# Patient Record
Sex: FEMALE | Race: WHITE | NOT HISPANIC OR LATINO | Employment: PART TIME
[De-identification: names, ages, dates, MRNs, and addresses within clinical notes are randomized per-mention and may not be internally consistent; named-entity substitution may affect disease eponyms.]

---

## 2016-09-16 LAB
PAP SMEAR - HIM PATIENT REPORTED: NEGATIVE
PAP: NORMAL

## 2017-06-24 ENCOUNTER — HEALTH MAINTENANCE LETTER (OUTPATIENT)
Age: 42
End: 2017-06-24

## 2017-08-17 ENCOUNTER — COMMUNICATION - HEALTHEAST (OUTPATIENT)
Dept: SURGERY | Facility: CLINIC | Age: 42
End: 2017-08-17

## 2017-08-17 DIAGNOSIS — K91.2 POSTSURGICAL MALABSORPTION: ICD-10-CM

## 2017-08-17 DIAGNOSIS — K90.9 INTESTINAL MALABSORPTION, UNSPECIFIED: ICD-10-CM

## 2017-08-17 DIAGNOSIS — Z98.84 HISTORY OF ROUX-EN-Y GASTRIC BYPASS: ICD-10-CM

## 2017-09-05 ENCOUNTER — OFFICE VISIT (OUTPATIENT)
Dept: FAMILY MEDICINE | Facility: CLINIC | Age: 42
End: 2017-09-05
Payer: COMMERCIAL

## 2017-09-05 VITALS
WEIGHT: 143.2 LBS | SYSTOLIC BLOOD PRESSURE: 108 MMHG | BODY MASS INDEX: 25.37 KG/M2 | DIASTOLIC BLOOD PRESSURE: 70 MMHG | HEIGHT: 63 IN | HEART RATE: 76 BPM

## 2017-09-05 DIAGNOSIS — F39 MOOD DISORDER (H): ICD-10-CM

## 2017-09-05 DIAGNOSIS — G47.00 PERSISTENT INSOMNIA: Primary | ICD-10-CM

## 2017-09-05 DIAGNOSIS — Z23 NEED FOR DIPHTHERIA-TETANUS-PERTUSSIS (TDAP) VACCINE: ICD-10-CM

## 2017-09-05 PROCEDURE — 99203 OFFICE O/P NEW LOW 30 MIN: CPT | Mod: 25 | Performed by: FAMILY MEDICINE

## 2017-09-05 PROCEDURE — 90715 TDAP VACCINE 7 YRS/> IM: CPT | Performed by: FAMILY MEDICINE

## 2017-09-05 PROCEDURE — 90471 IMMUNIZATION ADMIN: CPT | Performed by: FAMILY MEDICINE

## 2017-09-05 RX ORDER — QUETIAPINE FUMARATE 50 MG/1
TABLET, FILM COATED ORAL
Qty: 60 TABLET | Refills: 1 | Status: SHIPPED | OUTPATIENT
Start: 2017-09-05 | End: 2017-11-07

## 2017-09-05 RX ORDER — CYANOCOBALAMIN 1000 UG/ML
1 INJECTION, SOLUTION INTRAMUSCULAR; SUBCUTANEOUS
COMMUNITY
End: 2024-09-24

## 2017-09-05 ASSESSMENT — ANXIETY QUESTIONNAIRES
GAD7 TOTAL SCORE: 19
6. BECOMING EASILY ANNOYED OR IRRITABLE: NEARLY EVERY DAY
7. FEELING AFRAID AS IF SOMETHING AWFUL MIGHT HAPPEN: SEVERAL DAYS
5. BEING SO RESTLESS THAT IT IS HARD TO SIT STILL: NEARLY EVERY DAY
1. FEELING NERVOUS, ANXIOUS, OR ON EDGE: NEARLY EVERY DAY
3. WORRYING TOO MUCH ABOUT DIFFERENT THINGS: NEARLY EVERY DAY
2. NOT BEING ABLE TO STOP OR CONTROL WORRYING: NEARLY EVERY DAY

## 2017-09-05 ASSESSMENT — PATIENT HEALTH QUESTIONNAIRE - PHQ9
SUM OF ALL RESPONSES TO PHQ QUESTIONS 1-9: 16
5. POOR APPETITE OR OVEREATING: NEARLY EVERY DAY

## 2017-09-05 NOTE — NURSING NOTE
"Chief Complaint   Patient presents with     [facility] Care     Memory Loss     Memory issues.        Initial /70  Pulse 76  Ht 5' 3.25\" (1.607 m)  Wt 143 lb 3.2 oz (65 kg)  BMI 25.17 kg/m2 Estimated body mass index is 25.17 kg/(m^2) as calculated from the following:    Height as of this encounter: 5' 3.25\" (1.607 m).    Weight as of this encounter: 143 lb 3.2 oz (65 kg).  Medication Reconciliation: complete   Donna Johnston CMA    "

## 2017-09-05 NOTE — MR AVS SNAPSHOT
After Visit Summary   9/5/2017    Kenya Zurita    MRN: [id number]           Patient Information     Date Of Birth          1975        Visit Information        Provider Department      9/5/2017 1:00 PM Yen Donaldson MD [facility]'s Diagnoses     Persistent insomnia    -  1    Mood disorder (H)           Follow-ups after your visit        Additional Services     MENTAL HEALTH REFERRAL       Your provider has referred you to: [facility]: [facility]  [phone]   [email or web address]   *Referral from [facility] Primary Care Provider required - Consultation Model - medication management & future refills will be returned to [facility] PCP upon completion of evaluation  *Please call to schedule an appointment.    All scheduling is subject to the client's specific insurance plan & benefits, provider/location availability, and provider clinical specialities.  Please arrive 15 minutes early for your first appointment and bring your completed paperwork.    Please be aware that coverage of these services is subject to the terms and limitations of your health insurance plan.  Call member services at your health plan with any benefit or coverage questions.                  Who to contact     Normal or non-critical lab and imaging results will be communicated to you by MyChart, letter or phone within 4 business days after the clinic has received the results. If you do not hear from us within 7 days, please contact the clinic through MyChart or phone. If you have a critical or abnormal lab result, we will notify you by phone as soon as possible.  Submit refill requests through [employer] or call your pharmacy and they will forward the refill request to us. Please allow 3 business days for your refill to be completed.          If you need to speak with a  for additional  "information , please call: [phone]             Additional Information About Your Visit        MyChart Information     [facility] gives you secure access to your electronic health record. If you see a primary care provider, you can also send messages to your care team and make appointments. If you have questions, please call your primary care clinic.  If you do not have a primary care provider, please call [phone] and they will assist you.        Care EveryWhere ID     This is your Care EveryWhere ID. This could be used by other organizations to access your [place] medical records  [id number]        Your Vitals Were     Pulse Height BMI (Body Mass Index)             76 5' 3.25\" (1.607 m) 25.17 kg/m2          Blood Pressure from Last 3 Encounters:   09/05/17 108/70   07/09/12 125/70    Weight from Last 3 Encounters:   09/05/17 143 lb 3.2 oz (65 kg)              We Performed the Following     MENTAL HEALTH REFERRAL          Today's Medication Changes          These changes are accurate as of: 9/5/17  1:59 PM.  If you have any questions, ask your nurse or doctor.               Start taking these medicines.        Dose/Directions    QUEtiapine 50 MG tablet   Commonly known as:  SEROQUEL   Used for:  Persistent insomnia   Started by:  Yen Donaldson MD        Start with 1 tablet at bedtime may increase to 2 if tolerated   Quantity:  60 tablet   Refills:  1            Where to get your medicines      These medications were sent to [place] [address] [place] [address] [address]     Phone:  [phone]     QUEtiapine 50 MG tablet                Primary Care Provider Office Phone # Fax #    Briseyda Michael [phone]       [facility] [address] [facility] [address]        Equal Access to Services     SRI LANGFORD AH: Yojana Morris, robel clemente, karan mo " ah. So [facility] [phone].    ATENCIÓN: Si neil leone, tiene a mercado disposición servicios gratuitos de asistencia lingüística. Tara al [phone].    We comply with applicable federal civil rights laws and [place] laws. We do not discriminate on the basis of race, color, national origin, age, disability sex, sexual orientation or gender identity.            Thank you!     Thank you for choosing [facility]  for your care. Our goal is always to provide you with excellent care. Hearing back from our patients is one way we can continue to improve our services. Please take a few minutes to complete the written survey that you may receive in the mail after your visit with us. Thank you!             Your Updated Medication List - Protect others around you: Learn how to safely use, store and throw away your medicines at www.disposemymeds.org.          This list is accurate as of: 9/5/17  1:59 PM.  Always use your most recent med list.                   Brand Name Dispense Instructions for use Diagnosis    CALCIUM PO           cyanocobalamin 1000 MCG/ML injection    VITAMIN B12     Inject 1 mL into the muscle every 7 days        MULTI-VITAMIN DAILY PO           QUEtiapine 50 MG tablet    SEROQUEL    60 tablet    Start with 1 tablet at bedtime may increase to 2 if tolerated    Persistent insomnia       VITAMIN D PO

## 2017-09-05 NOTE — PROGRESS NOTES
SUBJECTIVE:                                                    Kenya Zurita is a 42 year old female who presents to clinic today for the following health issues:    Abnormal Mood Symptoms  Onset: earlier this year    Description:   Depression: YES  Anxiety: YES    Accompanying Signs & Symptoms:         Still participating in activities that you used to enjoy: YES  Fatigue: YES  Irritability: YES  Difficulty concentrating: YES  Changes in appetite: no  Problems with sleep: YES  Heart racing/beating fast : YES- sometimes  Thoughts of hurting yourself or others: none    History:   Recent stress: YES- recent move  Prior depression hospitalization: 2001   Family history of depression: YES  Family history of anxiety: YES    Precipitating factors:   Alcohol/drug use: no    Alleviating factors:  no  Therapies Tried and outcome: Ativan (Lorezepam), Buspar (Buspirone), Celexa (Citalopram), Effexor XR (Venafaxine), Lexapro (Escitalopram), Paxil (Paroxetine), Prozac (Fluoxetine), Remeron (Mirtazapine), Wellbutrin (Bupropion) and Zoloft (Sertraline) - most would help in the beginging and than wear off.     PHQ-9 ([employer]) 9/5/2017   1.  Little interest or pleasure in doing things 1   2.  Feeling down, depressed, or hopeless 1   3.  Trouble falling or staying asleep, or sleeping too much 3   4.  Feeling tired or having little energy 3   5.  Poor appetite or overeating 0   6.  Feeling bad about yourself 2   7.  Trouble concentrating 3   8.  Moving slowly or restless 3   9.  Suicidal or self-harm thoughts 0   PHQ-9 Total Score 16   MINNA-7   [employer], 2002; Used with Permission) 9/5/2017   1. Feeling nervous, anxious, or on edge 3   2. Not being able to stop or control worrying 3   3. Worrying too much about different things 3   4. Trouble relaxing 3   5. Being so restless that it is hard to sit still 3   6. Becoming easily annoyed or irritable 3   7. Feeling afraid, as if something awful might happen 1   MINNA-7 Total Score 19        Problem list and histories reviewed & adjusted, as indicated.  Additional history: she has been concerned about having more concentration difficulties. In the last 6-9 months she is not sleeping well .  She will take the medications and she will take them for a month and she will feel like they start working and then she would switch medications after a year or 2   Now she doesn't even bother trying  She has been diagnosed with bipolar and she was put on zyprexa in the past but that did not help  She feels like she is antsy and saw a therapist in [place] while she was going through a divorce she was on an SSRI during this time   Her sx right now are forgetfulness. She will walk into a room and can't remember why she came in . Can't remember conversations  She is working right now she is a    She does not sleep well at night she will fall asleep and then will get out of bed and walk around   Goes to bed at 9-10 gets up at 545   Wakes up at 12 then may wake up a few more times   Gastric bypass 2009  Has been on multiple medications in the past   Has not seen a psychiatrist before   She has been doing reasearch on Motally and she thinks she has ADD. She has not been diagnosed with this and wants to know if this could be her dx.  She is also not sleeping more than a few hours per night . She can fall asleep then wakes up . She has used ambien but it never works for more than 2 hours or so.   She does have thoughts that race through her head and she can't shut her mind off.           There is no problem list on file for this patient.    Past Surgical History:   Procedure Laterality Date     GALLBLADDER SURGERY  04/2004     GASTRIC BYPASS  2009     HERNIA REPAIR  11/2014    with Tummy tuck       Social History   Substance Use Topics     Smoking status: Never Smoker     Smokeless tobacco: Never Used     Alcohol use Yes     Family History   Problem Relation Age of Onset     Other Cancer Mother      Cem      Other Cancer Father      Skin     Colon Cancer Maternal Grandfather      Other Cancer Paternal Grandfather      Brain cancer     MENTAL ILLNESS Brother      MENTAL ILLNESS Sister      Asthma Son               ROS:  Constitutional, HEENT, cardiovascular, pulmonary, GI, , musculoskeletal, neuro, skin, endocrine and psych systems are negative, except as otherwise noted.      OBJECTIVE:                                                    MENTAL STATUS EXAM:    1. Clinical observations: Kenya was clean and was adequately groomed. Kenya's emotional presentation was cooperative and isauro. She spoke talkative/verbose and fast. She maintained fair eye contact and she was cooperative in answering questions.   2. She appeared to be well-oriented in all spheres with coherent, logical, relevent and accelerated thinking.   3. Thought content: She denies somatic preoccupation, loosening of association, grandiose, paranoid, flight of ideas, auditory hallucinations, visual hallucinations, olfactory hallucination, tactile hallucinations, delusions, compulsions and obessions.   4. Affect and mood: Kenya's affect is described as anxious and her emotional attitude was cooperative and isauro. She reports the following sypmtoms: difficulty sleeping, difficulty concentrating, lack of interest or enjoyment, restless, racing thoughts, feeling easily distracted, too much worry, nervous or tense feeling and dwelling on problems.    5. Sensorium and cognition: She was in contact with reality and oriented to time, place and person.  She demonstrated no impairment in immediate, recent, or remote memory. Her insight was adequate and her  intelligence appeared to be average.         ASSESSMENT/PLAN:                                                      1. Persistent insomnia    - QUEtiapine (SEROQUEL) 50 MG tablet; Start with 1 tablet at bedtime may increase to 2 if tolerated  Dispense: 60 tablet; Refill: 1  - MENTAL HEALTH REFERRAL    2. Mood disorder  (H)    - MENTAL HEALTH REFERRAL    3. Need for diphtheria-tetanus-pertussis (Tdap) vaccine    - TDAP VACCINE (ADACEL)  - ADMIN 1st VACCINE     reports that she has never smoked. She has never used smokeless tobacco.          Yen Donaldson M.D.  [facility]

## 2017-09-06 ASSESSMENT — ANXIETY QUESTIONNAIRES: GAD7 TOTAL SCORE: 19

## 2017-09-11 ENCOUNTER — OFFICE VISIT (OUTPATIENT)
Dept: PSYCHIATRY | Facility: CLINIC | Age: 42
End: 2017-09-11
Attending: FAMILY MEDICINE
Payer: COMMERCIAL

## 2017-09-11 VITALS
BODY MASS INDEX: 25.45 KG/M2 | WEIGHT: 144.8 LBS | DIASTOLIC BLOOD PRESSURE: 71 MMHG | SYSTOLIC BLOOD PRESSURE: 107 MMHG | TEMPERATURE: 99.1 F | HEART RATE: 78 BPM

## 2017-09-11 DIAGNOSIS — F90.2 ATTENTION DEFICIT HYPERACTIVITY DISORDER (ADHD), COMBINED TYPE: Primary | ICD-10-CM

## 2017-09-11 PROCEDURE — 90792 PSYCH DIAG EVAL W/MED SRVCS: CPT | Performed by: CLINICAL NURSE SPECIALIST

## 2017-09-11 ASSESSMENT — ANXIETY QUESTIONNAIRES
2. NOT BEING ABLE TO STOP OR CONTROL WORRYING: NEARLY EVERY DAY
6. BECOMING EASILY ANNOYED OR IRRITABLE: MORE THAN HALF THE DAYS
5. BEING SO RESTLESS THAT IT IS HARD TO SIT STILL: NEARLY EVERY DAY
IF YOU CHECKED OFF ANY PROBLEMS ON THIS QUESTIONNAIRE, HOW DIFFICULT HAVE THESE PROBLEMS MADE IT FOR YOU TO DO YOUR WORK, TAKE CARE OF THINGS AT HOME, OR GET ALONG WITH OTHER PEOPLE: VERY DIFFICULT
7. FEELING AFRAID AS IF SOMETHING AWFUL MIGHT HAPPEN: SEVERAL DAYS
3. WORRYING TOO MUCH ABOUT DIFFERENT THINGS: NEARLY EVERY DAY
1. FEELING NERVOUS, ANXIOUS, OR ON EDGE: NEARLY EVERY DAY
4. TROUBLE RELAXING: NEARLY EVERY DAY
GAD7 TOTAL SCORE: 18

## 2017-09-11 ASSESSMENT — PATIENT HEALTH QUESTIONNAIRE - PHQ9: SUM OF ALL RESPONSES TO PHQ QUESTIONS 1-9: 14

## 2017-09-11 NOTE — PATIENT INSTRUCTIONS
Treatment Plan:  Continue quetiapine (Seroquel)  mg at bedtime.     Complete ADHD testing.     Follow up after testing.     - Recommend patient discuss medications with their pharmacist. Risks and benefits of medications discussed, including side effect profile.   - Safety plan was reviewed; to the ER as needed or call after hours crisis line; [phone]  - Education and counseling was done regarding use of medications, psychotherapy options  - Call [phone] for appointment or to speak to a nurse.   -Office hours: Monday through Thursday 8:00 am to 4:30 pm; Friday 8:00 am to Noon.   - Patient was given a copy of this Treatment Plan today.

## 2017-09-11 NOTE — NURSING NOTE
"No chief complaint on file.      Initial /71 (BP Location: Right arm, Patient Position: Sitting, Cuff Size: Adult Regular)  Pulse 78  Temp 99.1  F (37.3  C) (Tympanic)  Wt 144 lb 12.8 oz (65.7 kg)  BMI 25.45 kg/m2 Estimated body mass index is 25.45 kg/(m^2) as calculated from the following:    Height as of 9/5/17: 5' 3.25\" (1.607 m).    Weight as of this encounter: 144 lb 12.8 oz (65.7 kg).  Medication Reconciliation: complete    "

## 2017-09-11 NOTE — PROGRESS NOTES
"                                                         Outpatient Psychiatric Evaluation-Standard    Name: Kenya Zurita  : 1975  Date: 2017    Source of Referral:  Primary Care Physician: Briseyda Michael  Current Psychotherapist: None currently - saw 3-4 years ago.    Identifying Data:  Patient is a 42 year old, partnered / significant other female who presents for initial visit with me.  Patient is currently employed full time, . Patient attended the session alone.   60 minutes were spent on evaluation with 40 minutes CC time.    HPI:  Patient reports depression and anxiety since grade school. Patient started antidepressants at age 19. Patient has had multiple trials, with sertraline (Zoloft) working for 1-2 years, but nothing has really worked since.     Patient is currently talking quetiapine (Seroquel)  mg at bedtime, primarily due to sleep issues. Patient reports feeling overwhelmed with too much to do and is struggling to organize her thoughts. Patient reports forgetfulness, losing track of conversations, with scattered thoughts. Patient reports fiance becomes frustrated as patient cannot sit still as she becomes bored easily.  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 Patient reports in second grade, teachers said \"I would be great if I could pay attention or sit still\".     Patient had gastric bypass in .     Psychiatric Review of Symptoms:  Depression: Sleep: No change and 2-3 hours unless taking medications  Depressed Mood Interest: Decrease Energy: Decrease Concentration: Decrease Psychomotor slowing  Worthless: No change and for many years     Last PHQ-9 score = No Value exists for the : HP#PHQ9; 14  Katherine:  No symptoms  Mood Disorder Questionnaire: Negative    Anxiety: Feeling nervous or on edge  Uncontrolled worrying  Trouble relaxing  Restlessness  Easily annoyed or irritable  Thoughts of " "impending doom    GAD7 score: 18  Panic:  Palpitations  Shortness of Breath  Agoraphobia:  No  OCD:  No symptoms  Psychosis: No symptoms  ADD / ADHD: No symptoms  Gambling or shoplifting: No  Eating Disorder:  No symptoms  Suicide attempts:  Yes In 1999, attempted overdose by Prozac  Current SI risk:  No              Patient reports no suicidal feelings today. Risk is mitigated by commitment to family, \"My two kids\" Therefore, based on all available evidence including the factors cited above, he does not appear to be at imminent risk for self-harm, does not meet criteria for a 72-hr hold, and therefore remains appropriate for ongoing outpatient level of care. Currently does not have therapist.     Significant Losses / Trauma / Abuse / Neglect Issues:  There are no indications or report of: significant losses, trauma, abuse or neglect.    PTSD:  No symptoms    Issues of possible neglect are not present.    A safety and risk management plan has not been developed at this time, however client was given the after-hours number / 911 should there be a change in any of these risk factors.      Psychiatric History:   Hospitalizations: None  Past psychotherapy: medication(s) from physician / PCP    Past medication trials: (patient was presented with a list to review all currently available antidepressants, mood stabilizers, tranquilizers, hypnotics and antipsychotics)  New Antidepressants:  Celexa (citalopram), Cymbalta (duloxetine), Effexor (venlafaxine), Lexapro (escitalopram), Paxil (paroxetine), Prozac (fluoxetine), Remeron (mirtazepine) and Zoloft (sertraline)  Mood Stabilizers:  Depakote and Depakot ER (valproate/valproic acid)  Newer Antipsychotics: Risperdal (risperidone) and Zyprexa (olanzapine)  Sedatives/Hypnotics:  Ambien (zolpidem), Benadryl (diphenhydramine), Lunesta (eszopiclone), Melatonin, Restoril (temazepam) and Sonata (zaleplon)  Tranquilizers:  Atarax/Vistaril (hydroxyzine), Ativan (lorazepam), Buspar " "(buspirone) and Xanax (alprazolam)      Chemical Use History:  Patient has not received chemical dependency treatment in the past.  Patient reports no problems as a result of their drinking / drug use.  Current use of drugs or alcohol: N/A  CAGE: None of the patient's responses to the CAGE screening were positive / Negative CAGE score   Based on the negative Cage-Aid score and clinical interview there  are not indications of drug or alcohol abuse.  Tobacco use: No  Ready to quit?  No  NRT tried: NA    Past Medical History:  Surgery:   Past Surgical History:   Procedure Laterality Date     GALLBLADDER SURGERY  04/2004     GASTRIC BYPASS  2009     HERNIA REPAIR  11/2014    with Tummy tuck     Allergies:  No Known Allergies  Primary MD: Briseyda Michael  Seizures or head injury: Yes, 2 concussions  Diet: \"Normal\"  Exercise: no regular exercise program  Supplements: none    Current Medications:  Current Outpatient Prescriptions   Medication Sig     levonorgestrel (MIRENA) 20 MCG/24HR IUD 1 Device by Intrauterine route     Cholecalciferol (VITAMIN D PO)      cyanocobalamin (VITAMIN B12) 1000 MCG/ML injection Inject 1 mL into the muscle every 7 days     Multiple Vitamin (MULTI-VITAMIN DAILY PO)      CALCIUM PO      QUEtiapine (SEROQUEL) 50 MG tablet Start with 1 tablet at bedtime may increase to 2 if tolerated     No current facility-administered medications for this visit.        Vital Signs:  /71 (BP Location: Right arm, Patient Position: Sitting, Cuff Size: Adult Regular)  Pulse 78  Temp 99.1  F (37.3  C) (Tympanic)  Wt 144 lb 12.8 oz (65.7 kg)  BMI 25.45 kg/m2      Review of Systems:  (constitutional, HEENT, Neuro, Cardiac, Pulmonary, GI, , Heme / Lymph, Endocrine, Skin / Breast, MSK reviewed)  10 point ROS was negative except for the following: those listed above    Family History:   (with focus on psychiatric and substance abuse)  Chemical use problems Brother - alcohol  Mental health history: Brother - " "Bipolar  Patient reports family history includes Asthma in her son; Colon Cancer in her maternal grandfather; MENTAL ILLNESS in her brother and sister; Other Cancer in her father, mother, and paternal grandfather.    Social History:   Patient grew up in [place], [place]   Siblings: 2 half brothers, 1 half sister  Intact family growing up?; Parents  when patient age 14.   Highest education level was associate degree / vocational certificate.   Marital status and living situation: Lives with lisha and her two children  two children. Ages 13 and 15  she has not been involved with the legal system.      Mental Status Assessment:     Appearance:  Well groomed      Behavior/relationship to examiner/demeanor:  Cooperative, engaged and pleasant  Motor activity:  Normal  Gait:  Normal   Speech:  Normal in volume, articulation, coherence   Mood (subjective report):  \"Distracted\"  Affect (objective appearance):  Mood congruent  Thought Process (Associations):  Logical, linear and goal directed  Thought content:  No evidence of suicidal or homicidal ideation,          no overt psychosis and                    patient does not appear to be responding to internal stimuli  Oriented to person, place, date/time   Attention Span and concentration: Intact   Memory:  Short-term memory intact and Long-term memory; Intact  Language:  Fluent   Fund of Knowledge/Intelligence:  Average  Use of language: Intact   Abstraction:  Normal  Insight:  Adequate  Judgment:  Adequate for safety    DSM5  Diagnosis:    Rule Out Attention-Deficit/Hyperactivity Disorder  314.01 (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  Things that may interfere with the patient's success include:denies.    WHODAS 2.0 TOTAL " SCORES 9/11/2017   Total Score 35         Impression:  It appears patient may have ADHD, combined type which would not respond well to most antidepressants. After discussing treatment options, such as a trial of bupropion (Wellbutrin) SR formula due to gastric bypass or testing, patient elected to complete testing for ADHD. If testing does not support ADHD, will consider lamotrigine (Lamictal) for mood stabilization.     Medication side effects and alternatives reviewed.     Treatment Plan:  Continue quetiapine (Seroquel)  mg at bedtime.     Complete ADHD testing.     Follow up after testing.     - Recommend patient discuss medications with their pharmacist. Risks and benefits of medications discussed, including side effect profile.   - Safety plan was reviewed; to the ER as needed or call after hours crisis line; [phone]  - Education and counseling was done regarding use of medications, psychotherapy options  - Call [phone] for appointment or to speak to a nurse.   -Office hours: Monday through Thursday 8:00 am to 4:30 pm; Friday 8:00 am to Noon.   - Patient was given a copy of this Treatment Plan today.     My Practice Policy was reviewed and signed: YES       Patient will continue to be seen for ongoing consultation and stabilization.      Signed: Geraldine Brand, RN, MS, APRN                 Psychiatry

## 2017-09-12 ASSESSMENT — ANXIETY QUESTIONNAIRES: GAD7 TOTAL SCORE: 18

## 2017-10-13 ENCOUNTER — FCC EXTENDED DOCUMENTATION (OUTPATIENT)
Dept: PSYCHOLOGY | Facility: CLINIC | Age: 42
End: 2017-10-13

## 2017-10-13 ENCOUNTER — OFFICE VISIT (OUTPATIENT)
Dept: PSYCHOLOGY | Facility: CLINIC | Age: 42
End: 2017-10-13
Payer: COMMERCIAL

## 2017-10-13 DIAGNOSIS — F41.1 GAD (GENERALIZED ANXIETY DISORDER): Primary | ICD-10-CM

## 2017-10-13 DIAGNOSIS — F33.1 MDD (MAJOR DEPRESSIVE DISORDER), RECURRENT EPISODE, MODERATE (H): ICD-10-CM

## 2017-10-13 PROCEDURE — 90834 PSYTX W PT 45 MINUTES: CPT | Performed by: PSYCHOLOGIST

## 2017-10-13 ASSESSMENT — ANXIETY QUESTIONNAIRES
3. WORRYING TOO MUCH ABOUT DIFFERENT THINGS: NEARLY EVERY DAY
6. BECOMING EASILY ANNOYED OR IRRITABLE: NEARLY EVERY DAY
5. BEING SO RESTLESS THAT IT IS HARD TO SIT STILL: NEARLY EVERY DAY
7. FEELING AFRAID AS IF SOMETHING AWFUL MIGHT HAPPEN: NEARLY EVERY DAY
2. NOT BEING ABLE TO STOP OR CONTROL WORRYING: NEARLY EVERY DAY
IF YOU CHECKED OFF ANY PROBLEMS ON THIS QUESTIONNAIRE, HOW DIFFICULT HAVE THESE PROBLEMS MADE IT FOR YOU TO DO YOUR WORK, TAKE CARE OF THINGS AT HOME, OR GET ALONG WITH OTHER PEOPLE: EXTREMELY DIFFICULT
GAD7 TOTAL SCORE: 21
1. FEELING NERVOUS, ANXIOUS, OR ON EDGE: NEARLY EVERY DAY

## 2017-10-13 ASSESSMENT — PATIENT HEALTH QUESTIONNAIRE - PHQ9
SUM OF ALL RESPONSES TO PHQ QUESTIONS 1-9: 18
5. POOR APPETITE OR OVEREATING: NEARLY EVERY DAY

## 2017-10-13 NOTE — Clinical Note
Hello,  Here is the DA for Kenya's ADHD evaluation. We will proceed with testing and I will be sure to route the final report to you. Please let me know if you have questions or concerns.  Thank you! Anaya Pham, PhD, LP

## 2017-10-13 NOTE — PROGRESS NOTES
Progress Note - Initial Session    Client Name:  Kenya Zurita Date: 10/13/2017         Service Type: Individual/ADHD Eval Intake      Session Start Time: 12:00  Session End Time: 12:45       Session Length: 45 minutes      Session #: 1     Attendees: Client attended alone      Diagnostic Assessment in progress.  Unable to complete documentation at the conclusion of the first session due to gathering extensive information regarding symptom presentation, history, and impact on functioning. Client reported significant history of anxiety and depression. No risk issues reported.      Mental Status Assessment:  Appearance:   Disheveled   Eye Contact:   Good   Psychomotor Behavior: Hyperactive  Restless ; Client bounced and shook her leg quickly for the duration of the session  Attitude:   Cooperative   Orientation:   All  Speech   Rate / Production: Pressured    Volume:  Normal   Mood:    Anxious   Affect:    Appropriate   Thought Content:  Clear   Thought Form:  Coherent  Logical   Insight:    Good       Safety Issues and Plan for Safety and Risk Management:  Client denies current fears or concerns for personal safety.  Client denies current or recent suicidal ideation or behaviors.  Client denies current or recent homicidal ideation or behaviors.  Client denies current or recent self injurious behavior or ideation.  Client denies other safety concerns.  A safety and risk management plan has not been developed at this time, however client was given the after-hours number / 911 should there be a change in any of these risk factors.  Client reports there are no firearms in the house.      Diagnostic Criteria:  A. Excessive anxiety and worry about a number of events or activities (such as work or school performance).   B. The person finds it difficult to control the worry.  C. Select 3 or more symptoms (required for diagnosis). Only one item is required in children.   - Restlessness or feeling keyed up  or on edge.    - Being easily fatigued.    - Difficulty concentrating or mind going blank.    - Irritability.    - Muscle tension.    - Sleep disturbance (difficulty falling or staying asleep, or restless unsatisfying sleep).   D. The focus of the anxiety and worry is not confined to features of an Axis I disorder.  E. The anxiety, worry, or physical symptoms cause clinically significant distress or impairment in social, occupational, or other important areas of functioning.   F. The disturbance is not due to the direct physiological effects of a substance (e.g., a drug of abuse, a medication) or a general medical condition (e.g., hyperthyroidism) and does not occur exclusively during a Mood Disorder, a Psychotic Disorder, or a Pervasive Developmental Disorder.        DSM5 Diagnoses: (Sustained by DSM5 Criteria Listed Above)  Diagnoses: 300.02 (F41.1) Generalized Anxiety Disorder  Psychosocial & Contextual Factors: Client reported a history of anxiety dating back to childhood. She recalled experiencing severe anxiety as early as 4th grade. She has tried multiple antidepressants and is working with PCP and psychiatry. She described having difficulty completing tasks and being organized at home.   WHODAS 2.0 (12 item)            This questionnaire asks about difficulties due to health conditions. Health conditions  include  disease or illnesses, other health problems that may be short or long lasting,  injuries, mental health or emotional problems, and problems with alcohol or drugs.                     Think back over the past 30 days and answer these questions, thinking about how much  difficulty you had doing the following activities. For each question, please [place] only  one response.    S1 Standing for long periods such as 30 minutes? None =         1   S2 Taking care of household responsibilities? Severe =       4   S3 Learning a new task, for example, learning how to get to a new place? Moderate =   3   S4 How  much of a problem do you have joining community activities (for example, festivals, Orthodoxy or other activities) in the same way as anyone else can? Severe =       4   S5 How much have you been emotionally affected by your health problems? Severe =       4     In the past 30 days, how much difficulty did you have in:   S6 Concentrating on doing something for ten minutes? Extreme / or cannot do = 5   S7 Walking a long distance such as a kilometer (or equivalent)? None =         1   S8 Washing your whole body? Severe =       4   S9 Getting dressed? Moderate =   3   S10 Dealing with people you do not know? Severe =       4   S11 Maintaining a friendship? Severe =       4   S12 Your day to day work? Moderate =   3     H1 Overall, in the past 30 days, how many days were these difficulties present? Record number of days 30   H2 In the past 30 days, for how many days were you totally unable to carry out your usual activities or work because of any health condition? Record number of days  0   H3 In the past 30 days, not counting the days that you were totally unable, for how many days did you cut back or reduce your usual activities or work because of any health condition? Record number of days 15       Collateral Reports Completed:  Routed note to Care Team Member(s)      PLAN: (Homework, other):  Client RTC to complete ADHD DA. She was given self-report and collateral-report packets to complete for our next session.      Anaya Pham, PhD, LP

## 2017-10-13 NOTE — MR AVS SNAPSHOT
MRN:[id number]                      After Visit Summary   10/13/2017    Kenya Zurita    MRN: [id number]           Visit Information        Provider Department      10/13/2017 12:00 PM Anaya Pham, PhD [facility] ADHD      Your next 10 appointments already scheduled     Oct 26, 2017 10:00 AM CDT   Return Visit with Anaya Pham, PhD   [facility] ([facility])    [address] [place] [address] [id number] [phone]            Oct 26, 2017 11:00 AM CDT   Return Visit with Anaya Pham, PhD   [facility] ([facility])    [address] [place] [address] [phone]              MyChart Information     [employer] gives you secure access to your electronic health record. If you see a primary care provider, you can also send messages to your care team and make appointments. If you have questions, please call your primary care clinic.  If you do not have a primary care provider, please call [phone] and they will assist you.        Care EveryWhere ID     This is your Care EveryWhere ID. This could be used by other organizations to access your [facility] medical records  [id number]        Equal Access to Services     SRI LANGFORD : Hadii erin richardso Soglory, waaxda luqadaha, qaybta kaalmada ademeredith, karan pardo. So [facility] [phone].    ATENCIÓN: Si habla español, tiene a mercado disposición servicios gratuitos de asistencia lingüística. Llame al [phone].    We comply with applicable federal civil rights laws and [place] laws. We do not discriminate on the basis of race, color, national origin, age, disability, sex, sexual orientation, or gender identity.

## 2017-10-13 NOTE — Clinical Note
"Hello,  I met with Kenya today to begin the ADHD evaluation.  My initial impression is that the severe anxiety that she is currently experiencing will make it difficult to determine whether she also has ADHD. That is, her disorganization, inability to listen/focus, and forgetfulness are likely related to intense anxiety that she experiences on a daily basis. She reported doing well in school but described having intense anxiety during that time as well. She denied other symptoms of bipolar disorder (inflated mood, mood swings, impulsivity, risk-taking behavior, etc.) so I think we can rule out that diagnosis. The \"racing thoughts\" she experiences seem to be anxious thoughts. We will proceed with testing and I will be sure to route the DA and final report to you. Please let me know if you have questions or concerns.  Thank you! Anaya Pham, PhD, LP"

## 2017-10-14 ASSESSMENT — ANXIETY QUESTIONNAIRES: GAD7 TOTAL SCORE: 21

## 2017-10-26 ENCOUNTER — DOCUMENTATION ONLY (OUTPATIENT)
Dept: PSYCHOLOGY | Facility: CLINIC | Age: 42
End: 2017-10-26
Payer: COMMERCIAL

## 2017-10-26 ENCOUNTER — OFFICE VISIT (OUTPATIENT)
Dept: PSYCHOLOGY | Facility: CLINIC | Age: 42
End: 2017-10-26
Payer: COMMERCIAL

## 2017-10-26 DIAGNOSIS — F33.1 MDD (MAJOR DEPRESSIVE DISORDER), RECURRENT EPISODE, MODERATE (H): ICD-10-CM

## 2017-10-26 DIAGNOSIS — F41.1 GAD (GENERALIZED ANXIETY DISORDER): Primary | ICD-10-CM

## 2017-10-26 PROCEDURE — 99207 HC PSYCHOLOGICAL TEST BY PSYCHOLOGIST/MD, PER HR: CPT | Performed by: PSYCHOLOGIST

## 2017-10-26 PROCEDURE — 99207 ZZC NO CHARGE LOS: CPT | Performed by: PSYCHOLOGIST

## 2017-10-26 PROCEDURE — 96101 ZZHC PSYCHOLOGICAL TEST BY PSYCHOLOGIST/MD, PER HR: CPT | Performed by: PSYCHOLOGIST

## 2017-10-26 PROCEDURE — 90791 PSYCH DIAGNOSTIC EVALUATION: CPT | Mod: 59 | Performed by: PSYCHOLOGIST

## 2017-10-26 NOTE — PROGRESS NOTES
"Name: Kenya Zurita  MRN: [id number]  : 1975    Christian Adult ADHD Rating Scale-IV: Self and Other Reports (BAARS-IV)  The BAARS-IV assesses for symptoms of ADHD that are experienced in one's daily life. This assessment measure includes self and collateral rating scales designed to provide information regarding current and childhood symptoms of ADHD including inattention, hyperactivity, and impulsivity. Self-report scores are reported as percentiles. Scores at the 76th-83rd percentile are considered marginal, scores at the 84th-92nd percentile are considered borderline, scores at the 93rd-95th percentile are considered mild, scores at the 96th-98th percentile are considered moderate, and those at the 99th percentile are considered severe. Collateral or \"other\" rating scales are reported as number of symptoms observed in comparison to those reported by the client. Norms and percentile scores are not available for collateral reports.      Current Symptoms Scale--Self Report:   Client completed the self-report inventory of current symptoms. The results indicate that the client's Total ADHD Score was 55 which places her in the 99th percentile for overall ADHD symptoms. In addition, the client endorsed the following occur \"often\" or \"very often\": 5/9 (97th percentile) Inattention symptoms, 6/9 (99th percentile) Hyperactivity/Impulsivity symptoms, and 2/9 (86th percentile) Sluggish Cognitive Tempo symptoms. Client indicated that the reported symptoms have resulted in impaired functioning in school, home, work, and social relationships. Overall, the results suggest the client is reporting moderate symptoms of inattention and severe symptoms of hyperactivity/impulsivity at this time.      Current Symptoms Scale--Other Report:  Client's fiance completed the collateral report inventory of current symptoms. Based on the collateral contact's observation of symptoms, the client demonstrates the following \"often\" or \"very " "often\": 0/9 Inattention symptoms, 3/5 Hyperactivity symptoms, 0/4 Impulsivity symptoms, and 1/9 Sluggish Cognitive Tempo symptoms. The client's Total ADHD Score was 30. The collateral contact did not indicate whether the client demonstrates impaired functioning in any domains. The collateral- and self-report scores are discrepant. The collateral contact did not report observing significant symptoms of inattention or hyperactivity/impulsivity.      Childhood Symptoms Scale--Self-Report:  Client completed the self-report inventory of childhood symptoms. The results indicate that the client's Total ADHD Score was 41 which places her in the 93rd percentile for overall ADHD symptoms in childhood. In addition, the client endorsed having experienced the following \"often\" or \"very often\": 4/9 (93rd percentile) Inattention symptoms and 4/9 (94th percentile) Hyperactivity-Impulsivity symptoms. Client indicated that the reported symptoms resulted in impaired functioning in school and social relationships. Overall, the results suggest the client reported experiencing mild symptoms of inattention and hyperactivity/impulsivity as a child.     Childhood Symptoms Scale--Other Report:  Client's mother completed the collateral report inventory of childhood symptoms. Based on the collateral contact's recollection of client's childhood symptoms, the client demonstrated the following \"often\" or \"very often\": 3/9 Inattention symptoms and 5/9 Hyperactivity-Impulsivity symptoms. The client's Total ADHD Score was 44. The collateral contact indicated the client demonstrated impaired functioning in school, home, and social relationships. The collateral- and self-report scores are similar and suggest the client experienced some symptoms of inattention and hyperactivity/impulsivity in childhood.      Christian Functional Impairment Scale: Self and Other Reports (BFIS)  The BFIS is used to assess an individuals' psychosocial impairment in major " "life/daily activities that may be due to a mental health disorder. This assessment measure includes self and collateral rating scales. Self-report scores are reported as percentiles. Scores at the 76th-83rd percentile are considered marginal, scores at the 84th-92nd percentile are considered borderline, scores at the 93rd-95th percentile are considered mild, scores at the 96th-98th percentile are considered moderate, and those at the 99th percentile are considered severe. Collateral or \"other\" rating scales are reported as number of symptoms observed in comparison to those reported by the client. Norms and percentile scores are not available for collateral reports.      Results indicate the client identified impairment (scores at or greater than 93rd percentile) in the following areas: home-family, home-chores, social-strangers, social-friends, money management, sexual relations, daily responsibilities, self-care routines, health maintenance and childrearing. The client's Mean Impairment Score was 6.3 (96th percentile) indicating the client is reporting moderate impairment in functioning across domains. Client's fiance completed the collateral rating scale, which indicated discrepant results. The collateral contact s scores were considerably lower than Client s scores (e.g., Mean Impairment Score of 2.4). Her fiancé only identified impairment in the following area: social-strangers.      Christian Deficits in Executive Functioning Scale (BDEFS)  The BDEFS is a measure used for evaluating dimensions of adult executive functioning in daily life. This assessment measure includes self and collateral rating scales. Self-report scores are reported as percentiles. Scores at the 76th-83rd percentile are considered marginal, scores at the 84th-92nd percentile are considered borderline, scores at the 93rd-95th percentile are considered mild, scores at the 96th-98th percentile are considered moderate, and those at the 99th " "percentile are considered severe. Collateral or \"other\" rating scales are reported as number of symptoms observed in comparison to those reported by the client. Norms and percentile scores are not available for collateral reports.      Results indicate the client's Total Executive Functioning Score was 199 (93rd percentile). The ADHD-Executive Functioning Index score was 28 (96th percentile). These scores suggest the client has mild to moderate deficits in executive functioning. These deficits may be due to ADHD or another mental health disorder. Results indicate the client identified significant deficits in the following areas: self-management to time (moderate), self-organization/problem-solving (mild), and self-motivation (mild). Client's lisha completed the collateral rating scale which indicated discrepant results. He did not note deficits in any domains.     Generalized Anxiety Disorder Questionnaire (MINNA-7)  This questionnaire is designed to screen for anxiety in adults. Based on the client's score of 18, she is reporting severe symptoms of anxiety. Client identified the following symptoms of anxiety: feeling nervous, anxious, or on edge; not being able to stop or control worrying, worrying too much about different things, trouble relaxing, being so restless that it s hard to sit still, becoming easily annoyed or irritable, and feeling afraid as if something awful might happen.      Patient Health Questionnaire- 9 (PHQ-9)   This questionnaire is designed to screen for depression in adults. Based on the client's score of 10, she is reporting moderate symptoms of depression. Client identified the following symptoms of depression: little interest or pleasure in doing things, feeling down/depressed/hopeless, feeling tired or having little energy, feeling bad about self, trouble concentrating and feeling fidgety or restless.    "

## 2017-10-26 NOTE — MR AVS SNAPSHOT
MRN:[id number]                      After Visit Summary   10/26/2017    Kenya Zurita    MRN: [id number]           Visit Information        Provider Department      10/26/2017 11:00 AM Anaya Pham, PhD [facility] ADHD      MyChart Information     MyChart gives you secure access to your electronic health record. If you see a primary care provider, you can also send messages to your care team and make appointments. If you have questions, please call your primary care clinic.  If you do not have a primary care provider, please call [phone] and they will assist you.        Care EveryWhere ID     This is your Care EveryWhere ID. This could be used by other organizations to access your [facility] medical records  [id number]        Equal Access to Services     SRI LANGFORD : Yojana Morris, [place] clemente, qakiley kaalchhaya bravo, karan pardo. So [facility] [phone].    ATENCIÓN: Si habla español, tiene a mercado disposición servicios gratuitos de asistencia lingüística. Llame al [phone].    We comply with applicable federal civil rights laws and [place] laws. We do not discriminate on the basis of race, color, national origin, age, disability, sex, sexual orientation, or gender identity.

## 2017-10-26 NOTE — MR AVS SNAPSHOT
MRN:[id number]                      After Visit Summary   10/26/2017    Kenya Zurita    MRN: [id number]           Visit Information        Provider Department      10/26/2017 10:00 AM Anaya Pham, PhD [facility] ADHD      MyChart Information     MyChart gives you secure access to your electronic health record. If you see a primary care provider, you can also send messages to your care team and make appointments. If you have questions, please call your primary care clinic.  If you do not have a primary care provider, please call [phone] and they will assist you.        Care EveryWhere ID     This is your Care EveryWhere ID. This could be used by other organizations to access your [facility] medical records  [id number]        Equal Access to Services     SRI LANGFORD : Yojana Morris, wadomingo clemente, qakiley kaalchhaya bravo, karan pardo. So [facility] [place] [phone].    ATENCIÓN: Si habla español, tiene a mercado disposición servicios gratuitos de asistencia lingüística. Llame al [phone].    We comply with applicable federal civil rights laws and [place] laws. We do not discriminate on the basis of race, color, national origin, age, disability, sex, sexual orientation, or gender identity.

## 2017-10-26 NOTE — PROGRESS NOTES
"                                                                                         Adult Intake Structured Interview      CLIENT'S NAME: Kenya Zurita  MRN:   [id number]  :   1975  ACCT. NUMBER: [id number]  DATE OF SERVICE: 10/13/17 and 10/26/17      Identifying Information:  Client is a 42 year old, , partnered / significant other female. Client was referred for a diagnostic assessment by PCP, Dr. Donaldson and psychiatrist, Geraldine Brand.  The purpose of this evaluation is to: provide treatment recommendations, clarify diagnosis and rule out ADHD vs Bipolar Disorder.  Client is currently employed full time. Client attended the session alone.       Client's Statement of Presenting Concern:  Client reported seeking services at this time for diagnostic assessment and recommendations for treatment. Client's presenting concerns include: \"Memory issues, concentration issues, organizational issues, restlessness, sleeplessness, anxiety.\"  Client stated that her symptoms have resulted in the following functional impairments: home life with family and friends, relationship(s) and \"finances, emotional\".      History of Presenting Concern:  Client reported that she has not completed a previous ADHD diagnostic assessment. Client has received a previous diagnosis of Anxiety and Depression.  Client has been prescribed medication to address these problems.  Client reported that medication was not helpful and did not cause unpleasant side effects. Client reported that these problems with anxiety began in childhood. She stated, \"In 4th grade I was having some issues going on. I had a mental breakdown and and so much anxiety that I was getting sick with stomach aches and I lost a lot of weight. I had a great teacher so she would let me have breakfast in the room even though you weren't supposed to do that. She also let me skip recess because I was so nervous and felt so uncomfortable around other kids so she " "let me stay inside to help her grade papers. I would end up in the nurse's office a lot. My mom ran out of vacation time trying to come get me so the nurse would just let me sleep in her office until my mom could come get me.\" Client has attempted to resolve these concerns in the past through medications from PCP, psychiatry, and counseling. She reported she was first diagnosed with anxiety and depression at age 19. She explained that she has been prescribed various medications including: Ativan, Buspar, Celexa, Effexor, Lexapro, Prozac, Mirtazapine, Wellbutrin, Zoloft, and Ambien. She reported that each med \"helped at first, but then wore off.\" Client reported that other professional(s) are involved in providing support / services. Client is working with PCP and psychiatrist for medication management. She is currently prescribed Seroquel for sleep.      Social History:  Client reported she grew up in [place], [place]. Client was the fourth born of 4 children (her mother had a child before marrying Client's father; her father had two children before marrying Client's mother). Client's parents  when she was 11/14 years old. Her father remarried soon after the divorce and her mother remarried when she was 22 years old. Client reported that her childhood was \"fine\", stating, \"I just remember that I didn't want to go to sleep-overs or do that stuff ever. I did pretty well in school though.\"  Client described her childhood family environment as dysfunctional. She stated, \"My parents fought constantly when they were around each other. My dad would make fun of my mom a lot and call her 'fat' a lot. My brother started drinking when he was like 13 so he had problems.\" As a child, client reported that she failed to complete assigned chores in the home environment, had problems getting ready for school in the morning, had problems with organization and keeping track of items, had problems managing temper with frequent " "emotional outbursts and had difficulty managing personal hygiene. She stated, \"It wasn't anger that I had, more like sadness. I was usually okay with a list to check off that helped me complete chores. I wouldn't shower regularly because I was always doing something else. I still do that.\" Client reported difficulty with childhood peer relationships. She described feeling anxious around other people and reported she had difficulty meeting people and making friends. She described herself as shy and withdrawn.Client described her current relationships with family of origin as inconsistent with behavior and communication.  She reported she is close with her mother but does not speak to her father or her half-sister.    Client reported a history of one marriage. She reported she  in 1999, stating, \"It was such a bad idea. I had only met the carlos three times and I never liked him. He was in the  and we had two kids together. I didn't know him and it was an impulsive thing. I shouldn't have done that.\" They  in 2011. Client has been partnered / significant other for 8 months and is engaged to her partner. They work together and have known each other for one year. Client reported having 2 children ages 11 and 13. Client identified some stable and meaningful social connections. Client reported that she has not been involved with the legal system.      Client's highest education level was high school graduate and associate degree / vocational certificate. Client reported she graduated from [employer] in 1995 with a 3.0-3.5 GPA. She reported obtaining As and Bs, stating, \"I did better in hands-on classes. I would put off homework until the last minute. I would get it done and I somehow got good grades . I did okay on tests but I would prefer to be sick and miss that day so I could take the test alone with more time. I would get distracted by people sniffling and the sounds of their pencils " "on the paper. I got nervous when people got up to turn theirs in.\" During the elementary, middle, and high school years, patient recalls academic strengths in the area of math, physical education and home-ec and hands on activities. Client reported experiencing academic problems in social studies and history and literature. She stated, \"I had a harder time reading and remembering things. It was hard to focus on things I didn't like.\" Client did identify the following learning problems: attention and concentration. She reported experiencing significant anxiety at a young age and reported having a \"mental breakdown\" in 4th grade which required that her mother and teacher allowed for \"special considerations.\" She stated, \"In 4th grade I was having some issues going on. I had a mental breakdown and and so much anxiety that I was getting sick with stomach aches and I lost a lot of weight. I had a great teacher so she would let me have breakfast in the room even though you weren't supposed to do that. She also let me skip recess because I was so nervous and felt so uncomfortable around other kids so she let me stay inside to help her grade papers. I would end up in the nurse's office a lot. My mom ran out of vacation time trying to come get me so the nurse would just let me sleep in her office until my mom could come get me.\" Client did not receive tutoring services during the school years. Client did not receive special education services. Client reported no particular problems during the school years. She stated, \"I went through my old report cards and saw that teachers wrote that I wouldn't sit still and kept bugging other kids. But they said I was very smart and had potential.\" She described having difficulty with time management and getting things done, stating, \"I would wait until the last minute to do things but I tended to do well.\" Client did attend post secondary school. She reported that she completed an " "associate's degree in Office and Administration from [employer] in 1996. She stated, \"It was a 2 year degree but I finished it in 15 months. I thought it was so dumb and easy. I did well and turned things in on time. I think I had a B average.\"     Client did not serve in the .  Client reported that she is currently employed. Client reported that the current job is a good fit for her skills and personality.  Client reported that she often felt bored, often been late in completing projects, disorganized behavior, distractible behavior and poor time management .  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  The longest period of employment has been 15.5 years.  Client has not been terminated from a place of employment. There are no ethnic, cultural or Jain factors that may be relevant for therapy. Client identified her preferred language to be English. Client reported she does not need the assistance of an  or other support involved in treatment. Modifications will not be used to assist communication in treatment.     Client reports family history includes Asthma in her son; Bipolar Disorder in her brother; Colon Cancer in her maternal grandfather; Depression in her mother and sister; MENTAL ILLNESS in her brother and sister; Other Cancer in her father, mother, and paternal grandfather.    Mental Health History:  Client reported the following biological family members or relatives with mental health issues: Mother experienced Depression, Brother experienced Bipolar Disorder and Sister experienced Depression.  Client previously received the following mental health diagnosis: Anxiety and Depression.  Client has received the following mental health services in the past: counseling, medication(s) from physician / PCP and psychiatry. Hospitalizations: None.  Client " "reported she had attempted to overdose with a bottle of Prozac in 1999. She stated, \"I had just quit my job and moved to [place] and learned that my  would be deployed overseas for a year. They didn't even keep me over night because they didn't think that I wanted to actually kill myself. They thought I just wanted attention. They might have been right, that I was doing it to stop him from leaving.\" She reported that she first participated in counseling at age 19 when she was first diagnosed with anxiety. She reported participated in individual therapy once while living in [place], twice since moving to [place] and estimated that the last time she participated in therapy was in 2010 following her divorce. Previous / current commitments: None. Client is currently receiving the following services: medication(s) from physician / PCP and psychiatry. She has tried many prescribed medications in the past including Ativan, Buspar, Celexa, Effexor, Lexapro, Paxil, Prozac, Mirtazapine, Wellbutrin and Zoloft. She is currently prescribed Seroquel for sleep. Upon inquiry, Client denied experiencing symptoms of debby or hypomania. She stated, \"There may have been times when depression was less but that was probably when anxiety was higher. I've never felt hyper and I've never felt invincible or done anything that's crazy or reckless.\"    Chemical Health History:  Client reported the following biological family members or relatives with chemical health issues: Brother reportedly uses alcohol . Client has not received chemical dependency treatment in the past. Client is not currently receiving any chemical dependency treatment. Client reports no problems as a result of their drinking / drug use.      Client Reports:  Client reports using alcohol 3 times per week and has 6 beers at a time. Client first started drinking at age 18. Client stated, \"It helps my brain slow down a little. Sometimes it's just " "because my friends came over.\"   Client denies using tobacco.  Client reports using marijuana 2-3 times per year and smokes 1 at a time. Client started using marijuana at age 22.  Client reports using caffeine 12 times per day and drinks 1 at a time. Client started using caffeine at age 12. She reported she drinks a 12 pack of soda per day and has done this for years.  Client denies using street drugs.  Client denies the non-medical use of prescription or over the counter drugs.    CAGE: None of the patient's responses to the CAGE screening were positive / Negative CAGE score   Based on the negative Cage-Aid score and clinical interview there  are indications of drug or alcohol abuse. Diagnostic assessment for substance use disorder completed. Therapist did recommend client to reduce use or abstain from alcohol or substance use. Therapist did not recommend structured treatment and or community support (AA, 12 step group, etc.).  Client stated, \"When you say it that way that I have a six pack of beer three times a week that sounds bad, but that's not every week. That's just depending on whether or not my friends come over. I've never had any problems from drinking.\"    Discussed the general effects of drugs and alcohol on health and well-being. Therapist gave client printed information about the effects of chemical use on her health and well being.      Significant Losses / Trauma / Abuse / Neglect Issues:  There are indications or report of significant loss, trauma, abuse or neglect issues related to: divorce / relational changes her parents  at [age] and she  her first .    Issues of possible neglect are not present.      Medical Issues:  Client has had a physical exam to rule out medical causes for current symptoms.  Date of last physical exam was within the past year. Client was encouraged to follow up with PCP if symptoms were to develop.  The client has a [facility] Provider, " "who is named Dr. Donaldson. Client has a psychiatrist whose name and location are: Geraldine Brand.  Client reported no current medical concerns.  The client denies the presence of chronic or episodic pain.  As a child, client reported having sleep disturbance, including: insomnia . She stated, \"I had trouble falling asleep when I was little. I would think about things and worry about things.\"  Client reported currently experiencing sleep disturbance, including: insomnia and teeth grinding.  Client reported sleeping approximately 6 hours per night.  She reported, \"I've had trouble falling asleep for 7-8 years but the Seroquel helps. I actually get good sleep now.\" Client reported that she has completed a sleep study. She reported that she had sleep apnea which reportedly resolved after gastric bypass surgery in 2009. Client reported having an inconsistent diet, cravings for sweets, frequent meals from fast food restaurants and daily caffeine use.  There are not significant nutritional concerns.  Client reported engaging in regular exercise (her job is very physically active).    Client reports current meds as:   Current Outpatient Prescriptions   Medication Sig     levonorgestrel (MIRENA) 20 MCG/24HR IUD 1 Device by Intrauterine route     Cholecalciferol (VITAMIN D PO)      cyanocobalamin (VITAMIN B12) 1000 MCG/ML injection Inject 1 mL into the muscle every 7 days     Multiple Vitamin (MULTI-VITAMIN DAILY PO)      CALCIUM PO      QUEtiapine (SEROQUEL) 50 MG tablet Start with 1 tablet at bedtime may increase to 2 if tolerated     No current facility-administered medications for this visit.        Client Allergies:  No Known Allergies  no known allergies to medications    Medical History:  Past Medical History:   Diagnosis Date     Anxiety      Depression      Suicide attempt 2001       Medication Adherence:  Client reports taking prescribed medications as prescribed.    Client was provided recommendation to follow-up " "with prescribing physician.    Risk Taking Behaviors:  Client reported the following current risk taking behaviors: impulsive decision making and substance use       Motor Vehicle Operation:  Client has received a 's license. Client has received moving violations, including: accidents due to inattention and 2-3 speeding tickets.  She stated, \"This was when I was younger so we would cruise and get tickets for doing that.\" Client reported the following driving habits: experiencces road rage.  According to client, other people are comfortable riding as a passenger when she is driving.        Mental Status Assessment:  Appearance:   Disheveled   Eye Contact:   Good   Psychomotor Behavior: Hyperactive  Restless ; Client bounced her leg rapidly during the session and was wringing her hands repeatedly  Attitude:   Cooperative   Orientation:   All  Speech   Rate / Production: Normal    Volume:  Normal   Mood:    Anxious   Affect:    Appropriate   Thought Content:  Clear   Thought Form:  Coherent  Logical   Insight:    Good       Review of Symptoms:  Depression: Sleep Interest Guilt Energy Concentration Psychomotor slowing or agitation Irritability  Katherine:  No symptoms  Psychosis: No symptoms  Anxiety: Worries Nervousness  Panic:  No symptoms  Post Traumatic Stress Disorder: No symptoms  Obsessive Compulsive Disorder: No symptoms  Eating Disorder: No symptoms  Oppositional Defiant Disorder: No symptoms  ADD / ADHD: Task Completion Organization Forgetful  Conduct Disorder: No symptoms  Reckless Behavior: Substance Abuse        Safety Issues and Plan for Safety and Risk Management:  Client has had a history of suicide attempts: In 1999 client attempted overdose with a bottle of Prozac    Client denies current fears or concerns for personal safety.  Client denies current or recent suicidal ideation or behaviors.  Client denies current or recent homicidal ideation or behaviors.  Client denies current or recent self " "injurious behavior or ideation.  Client denies other safety concerns.  Client reports there are no firearms in the house.  A safety and risk management plan has not been developed at this time, however client was given the after-hours number / 911 should there be a change in any of these risk factors.      Diagnostic Criteria:  (1) Inattention: 6 or more of the following symptoms have persisted for at least 6 months to a degree that is inconsistent with developmental level and that negatively impacts directly on social and academic/occupational activities:  - Often fails to give close attention to details or makes careless mistakes in schoolwork, at work, or during other activities  - Often has difficulty sustaining attention in tasks or play activities  - Often does not seem to listen when spoken to directly  - Often does not follow through on instructions and fails to finish schoolwork, chores, or duties in the workplace  - Often avoids, dislikes, or is reluctant to engage in tasks that require sustained mental effort  - Is often easily distractedby extraneous stimuli  - Is often forgetful in daily activities  - Often fidgets with or taps hands or feet or squirms in seat  - Often leaves seat in situations when remaining seated is expected  - Often runs about or climbs in situationswhere it is inappropriate  - Is often \"on the go,\" acting as if \"driven by a motor\"  B) Several inattentive or hyperactive-impulsive symptoms were present prior to age 12 years  C) Several inattentive or hyperactive-impulsive symptoms are present in two or more settings  D) There is clear evidence that the symptoms interfere with, or reduce the quality of, social academic, or occupational functioning  E) The Symptoms do not occur exclusively during the course of schizophrenia or another psychotic disorder and are not better explained by another mental disorder  A. Excessive anxiety and worry about a number of events or activities (such " as work or school performance).   B. The person finds it difficult to control the worry.  C. Select 3 or more symptoms (required for diagnosis). Only one item is required in children.   - Restlessness or feeling keyed up or on edge.    - Being easily fatigued.    - Difficulty concentrating or mind going blank.    - Irritability.    - Muscle tension.    - Sleep disturbance (difficulty falling or staying asleep, or restless unsatisfying sleep).   D. The focus of the anxiety and worry is not confined to features of an Axis I disorder.  E. The anxiety, worry, or physical symptoms cause clinically significant distress or impairment in social, occupational, or other important areas of functioning.   F. The disturbance is not due to the direct physiological effects of a substance (e.g., a drug of abuse, a medication) or a general medical condition (e.g., hyperthyroidism) and does not occur exclusively during a Mood Disorder, a Psychotic Disorder, or a Pervasive Developmental Disorder.      A) Recurrent episode(s) - symptoms have been present during the same 2-week period and represent a change from previous functioning 5 or more symptoms (required for diagnosis)   - Depressed mood. Note: In children and adolescents, can be irritable mood.     - Diminished interest or pleasure in all, or almost all, activities.    - Decreased sleep.    - Psychomotor activity agitation.    - Fatigue or loss of energy.    - Diminished ability to think or concentrate, or indecisiveness.   B) The symptoms cause clinically significant distress or impairment in social, occupational, or other important areas of functioning  C) The episode is not attributable to the physiological effects of a substance or to another medical condition  D) The occurence of major depressive episode is not better explained by other thought / psychotic disorders  E) There has never been a manic episode or hypomanic episode    DSM5 Diagnoses: (Sustained by DSM5 Criteria  Listed Above)  Generalized Anxiety Disorder (F41.1)  Major Depressive Disorder, Recurrent, Moderate (F33.1)  Attention Deficit Hyperactivity Disorder, Combined Presentation (F90.2) (PROVISIONAL)  Attendance Agreement:  Client has signed Attendance Agreement:Yes      Preliminary Plan:  The client reports no currently identified Adventist, ethnic or cultural issues relevant to therapy.     services are not indicated.    Modifications to assist communication are not indicated.    The client is receiving treatment / structured support from the following professional(s) / service and treatment. Collaboration will be initiated with: primary care physician and psychiatry.    Referral to another professional/service is not indicated at this time.    A Release of Information is not needed at this time.    Client was given self and collaborative rating scales to be completed prior to the next appointment.  Depression and anxiety rating scales were completed.  A third appointment was scheduled at this time.  Client is completing the MMPI-2 today.     Report to child / adult protection services was NA.    Client will have access to their [facility]' medical record.    Anaya Pham, PhD, LP  October 26, 2017

## 2017-11-01 ENCOUNTER — DOCUMENTATION ONLY (OUTPATIENT)
Dept: PSYCHOLOGY | Facility: CLINIC | Age: 42
End: 2017-11-01
Payer: COMMERCIAL

## 2017-11-01 DIAGNOSIS — F41.1 GAD (GENERALIZED ANXIETY DISORDER): ICD-10-CM

## 2017-11-01 DIAGNOSIS — F33.1 MDD (MAJOR DEPRESSIVE DISORDER), RECURRENT EPISODE, MODERATE (H): Primary | ICD-10-CM

## 2017-11-01 PROCEDURE — 96101 HC PSYCHOLOGICAL TEST BY PSYCHOLOGIST/MD, PER HR: CPT | Performed by: PSYCHOLOGIST

## 2017-11-01 NOTE — PROGRESS NOTES
Name: Kenya Zurita  MRN: [id number]  : 1975    Client completed the [place] Multiphasic Personality Inventory-2 (MMPI-2), a self-report personality inventory, as part of her evaluation. Validity scales indicate that the client responded in an open and consistent manner, resulting in a valid profile. While overreporting is possible, it is also likely that the Client has limited resources for coping with the stresses and demands of daily life. The following results should be interpreted with caution and in light of other information. Her responses reflect high levels of general emotional distress and disturbance common in outpatient and inpatient psychiatric populations. Individuals with similar profiles experience moderate to severe depression with tension, anxiety, worry, intrusive thoughts, insecurity, and apprehensiveness. Depression is manifested in dysphoria and sad affect, feelings of helplessness, hopelessness, worthlessness, pessimism and inadequacy. Guilt and themes of failure, uselessness, and being overwhelmed are common. Individuals with similar profiles may ruminate about personal shortcomings, over-anticipate negative outcomes, and overreact to minor problems and mistakes. They may be withdrawn and introverted, feel awkward and self-conscious, and be easily embarrassed around others. They may lack drive, energy, interest, and motivation. They may also experience anxiety that inhibits the ability to complete normal tasks and duties. Anxiety may manifest as fear of mental collapse that is close to panic. They may feel stressed out and vulnerable to upset by disappointment or decisions that don t work out and dread that sudden unanticipated event will cause one to  go to pieces.  They may experience self-doubt, problems with concentration, memory, judgment, and decision making. They may also experience vegetative symptoms of depression such as anhedonia, sleep disturbance, and loss of interest,  energy and motivation. They may experience a sense of mental failure or decline and the depletion of energy needed to accomplish mental work. Thinking and problem-solving are experienced as effortful and as subject to going off course even when significant effort is made. Thinking may be viewed as impaired or unreliable; they may have a sense that  I can t seem to get my mind right.

## 2017-11-07 DIAGNOSIS — G47.00 PERSISTENT INSOMNIA: ICD-10-CM

## 2017-11-07 RX ORDER — QUETIAPINE FUMARATE 50 MG/1
TABLET, FILM COATED ORAL
Qty: 60 TABLET | Refills: 1 | Status: SHIPPED | OUTPATIENT
Start: 2017-11-07 | End: 2018-01-11

## 2017-11-30 ENCOUNTER — OFFICE VISIT (OUTPATIENT)
Dept: PSYCHOLOGY | Facility: CLINIC | Age: 42
End: 2017-11-30
Payer: COMMERCIAL

## 2017-11-30 ENCOUNTER — DOCUMENTATION ONLY (OUTPATIENT)
Dept: PSYCHOLOGY | Facility: CLINIC | Age: 42
End: 2017-11-30
Payer: COMMERCIAL

## 2017-11-30 DIAGNOSIS — F33.1 MDD (MAJOR DEPRESSIVE DISORDER), RECURRENT EPISODE, MODERATE (H): ICD-10-CM

## 2017-11-30 DIAGNOSIS — F41.1 GAD (GENERALIZED ANXIETY DISORDER): Primary | ICD-10-CM

## 2017-11-30 PROCEDURE — 99207 HC PSYCHOLOGICAL TEST BY PSYCHOLOGIST/MD, PER HR: CPT | Performed by: PSYCHOLOGIST

## 2017-11-30 PROCEDURE — 90832 PSYTX W PT 30 MINUTES: CPT | Mod: 59 | Performed by: PSYCHOLOGIST

## 2017-11-30 PROCEDURE — 96101 ZZHC PSYCHOLOGICAL TEST BY PSYCHOLOGIST/MD, PER HR: CPT | Performed by: PSYCHOLOGIST

## 2017-11-30 NOTE — MR AVS SNAPSHOT
MRN:[id number]                      After Visit Summary   11/30/2017    Kenya Zurita    MRN: [id number]           Visit Information        Provider Department      11/30/2017 12:00 PM Anaya Pham, PhD [facility] ADHD      MyChart Information     MyChart gives you secure access to your electronic health record. If you see a primary care provider, you can also send messages to your care team and make appointments. If you have questions, please call your primary care clinic.  If you do not have a primary care provider, please call [phone] and they will assist you.        Care EveryWhere ID     This is your Care EveryWhere ID. This could be used by other organizations to access your [place] medical records  [id number]        Equal Access to Services     SRI LANGFORD : Yojana Morris, wadomingo clemente, laura kaalchhaya bravo, karan pardo. So [facility] [phone].    ATENCIÓN: Si habla español, tiene a mercado disposición servicios gratuitos de asistencia lingüística. Llame al [phone].    We comply with applicable federal civil rights laws and [place] laws. We do not discriminate on the basis of race, color, national origin, age, disability, sex, sexual orientation, or gender identity.

## 2017-11-30 NOTE — Clinical Note
"Enzo Chandler and Dr. Donaldson,  I met with Kenya today to review results of the ADHD evaluation. She does not currently meet criteria for ADHD but she does meet criteria for MINNA and MDD, Recurrent, Moderate. I also included a Rule Out diagnosis of \"Caffeine Intoxication\" as she reported she drinks at least 12 cans of soda per day (and has done so for years). We discussed the impact of this on her anxiety and hyperactivity symptoms. Please let me know if you have questions or concerns.  Thank you, Anaya Pham, PhD, LP"

## 2017-11-30 NOTE — PROGRESS NOTES
Client Name: Kenya Zurita Date: 11/30/2017      Service Type: Individual (ADHD Evaluation feedback session)     Session Start Time: 12:00 Session End Time: 12:20     Session Length: 20 minutes      Session #: (feedback)     Attendees: Client attended alone        DATA    Treatment Objective(s) Addressed in This Session:   Provided feedback on ADHD evaluation. Reviewed test results in depth and answered client's questions. 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 This provider also completed full written report of evaluation, including integration of testing data, summary, and recommendations. Please see Documentation Only dated 11/30/17.     Progress on / Status of Treatment Objective(s) / Homework:   Completed      Intervention:  ADHD Evaluation feedback; Reviewed report (can be found in Documentation Only encounter dated 11/30/17). Client was appreciative of the feedback and expressed understanding of the diagnoses.         ASSESSMENT: Current Emotional / Mental Status (status of significant symptoms):  Risk status (Self / Other harm or suicidal ideation)  Client denies current fears or concerns for personal safety.  Client denies current or recent suicidal ideation or behaviors.  Client denies current or recent homicidal ideation or behaviors.  Client denies current or recent self-injurious behavior or ideation.  Client denies other safety concerns.  A safety and risk management plan has not been developed at this time, however client was given the after-hours number / 911 should there be a change in any of these risk factors.     Appearance: Appropriate   Eye Contact: Good   Psychomotor Behavior: Hyperactive/Fidgety    Attitude: Cooperative   Orientation: All  Speech  Rate / Production: Normal   Volume: Normal   Mood: Anxious  Affect: Anxious  Thought Content: Clear   Thought Form: Coherent Logical   Insight: Good       Medication Review:  Client is prescribed Seroquel     Medication Compliance:  Yes      Changes in Health Issues:  None reported     Chemical Use Review:  Substance Use: Chemical use reviewed, no active concerns identified (other than use of caffeine)     Tobacco Use: No current tobacco use.      Collateral Reports Completed:  Routed note to Care Team Member(s)     PLAN: (Homework, other)     Recommendations:    1. Schedule an appointment with your physician or psychiatrist to discuss a medication evaluation. Medications are often beneficial in treating anxiety and depressive symptoms. It is important to take your medication on a consistent basis.    2. It is recommended that caffeine intake is reduced. Use of this substance may be contributing to and/or exacerbating your experience with physical symptoms of anxiety (e.g., restlessness, nervousness, insomnia, and psychomotor agitation are symptoms of caffeine intoxication).     3. Individual therapy is recommended. Therapies focused on identifying and challenging problematic thought and behavior patterns while increasing the use of healthy coping skills has been found to be effective in treating anxiety and depression. It will be important to set goals in this therapy and work actively toward achieving short-term successes that lead to the completion of each goal. Action-oriented therapies, such as CBT or DBT, are particularly recommended for the treatment of chronic depression and anxiety.    4. The use of behavioral strategies such as diaphragmatic breathing, guided imagery, and mindfulness is often helpful in the management of anxiety and depressive symptoms.     5. The following compensatory strategies may be useful to cope with reported inattention symptoms:   a. Maintaining a predictable routine and structured environment that incorporates prioritized checklists and reminders (e.g., Post-Its).  b. When completing tasks, try to focus on one task at a time  and complete it in its entirety before moving on to the next task.  c. Minimize background distractions when working on complex tasks. For example, TV, radio or ongoing conversations in the background may hinder ability to focus on the task at hand.  d. Take regular breaks from tasks that require prolonged attention. In general, regular breaks from complex tasks can help prevent lapses in attention, which can result in errors.  e. Outline the steps required to complete a task prior to beginning it, which can help ensure an organized approach. Use the outline to refer to throughout the task as a reminder of the steps to be completed.    6. If the anxiety and depression are adequately treated and you continue to have difficulty with inattention and hyperactivity/impulsivity, a re-evaluation may be warranted.       Anaya Pham, PhD, LP  Licensed Psychologist

## 2017-11-30 NOTE — PROGRESS NOTES
[facility]  ADHD Evaluation     Patient: Kenya Zurita  MRN: [id number]  : 1975    Date(s) of assessment: Diagnostic Assessment (10/13/17, 10/26/17), Christian self-report and collateral measures scored and interpreted (10/26/17), MMPI -2 (administered on 10/26/17, interpreted on 17)      Information about appointment:  Client attended three sessions to aid in determining client's mental health diagnosis or diagnoses and treatment recommendations that best address client concerns. Available medical records were reviewed. There were no previous psychological evaluations for review. A diagnostic assessment was conducted at the initial appointment. Client completed several rating scales to assist in assessing attention-related and other mental health symptoms that may be causing impairments in functioning. Rating scales were also completed by a collateral contact. Client also completed personality testing to aid in diagnostic clarification.      Assessment tools:      Christian Adult ADHD Rating Scale-IV: Self and Other Reports (BAARS-IV), Christian Functional Impairment Scale: Self and Other Reports (BFIS), Christian Deficits in Executive Functioning Scale: Self and Other Reports (BDEFS), Patient Health Questionnaire-9 (PHQ-9), Generalized Anxiety Disorder-7 (MINNA-7) and [place] Multiphasic Personality Inventory (MMPI)     Assessment Results:     Behavioral Observations:  Client arrived to each session on-time. She was pleasant and cooperative at all times. Client did not demonstrate difficulties with inattention during the sessions, but she did demonstrate significant anxiety and hyperactivity. She squirmed in her seat, bounced her leg up and down throughout the duration of the clinical interview sessions and was often rubbing/wringing her hands. The following results are likely to be an accurate reflection of client's current functioning.      Christian Adult ADHD Rating Scale-IV: Self and Other  "Reports (BAARS-IV)  The BAARS-IV assesses for symptoms of ADHD that are experienced in one's daily life. This assessment measure includes self and collateral rating scales designed to provide information regarding current and childhood symptoms of ADHD including inattention, hyperactivity, and impulsivity. Self-report scores are reported as percentiles. Scores at the 76th-83rd percentile are considered marginal, scores at the 84th-92nd percentile are considered borderline, scores at the 93rd-95th percentile are considered mild, scores at the 96th-98th percentile are considered moderate, and those at the 99th percentile are considered severe. Collateral or \"other\" rating scales are reported as number of symptoms observed in comparison to those reported by the client. Norms and percentile scores are not available for collateral reports.     Current Symptoms Scale--Self Report:   Client completed the self-report inventory of current symptoms. The results indicate that the client's Total ADHD Score was 55 which places her in the 99th percentile for overall ADHD symptoms. In addition, the client endorsed the following occur \"often\" or \"very often\": 5/9 (97th percentile) Inattention symptoms, 6/9 (99th percentile) Hyperactivity/Impulsivity symptoms, and 2/9 (86th percentile) Sluggish Cognitive Tempo symptoms. Client indicated that the reported symptoms have resulted in impaired functioning in school, home, work, and social relationships. Overall, the results suggest the client is reporting moderate symptoms of inattention and severe symptoms of hyperactivity/impulsivity at this time.      Current Symptoms Scale--Other Report:  Client's fiance completed the collateral report inventory of current symptoms. Based on the collateral contact's observation of symptoms, the client demonstrates the following \"often\" or \"very often\": 0/9 Inattention symptoms, 3/5 Hyperactivity symptoms, 0/4 Impulsivity symptoms, and 1/9 Sluggish " "Cognitive Tempo symptoms. The client's Total ADHD Score was 30. The collateral contact did not indicate whether the client demonstrates impaired functioning in any domains. The collateral- and self-report scores are discrepant. The collateral contact did not report observing significant symptoms of inattention or hyperactivity/impulsivity.      Childhood Symptoms Scale--Self-Report:  Client completed the self-report inventory of childhood symptoms. The results indicate that the client's Total ADHD Score was 41 which places her in the 93rd percentile for overall ADHD symptoms in childhood. In addition, the client endorsed having experienced the following \"often\" or \"very often\": 4/9 (93rd percentile) Inattention symptoms and 4/9 (94th percentile) Hyperactivity-Impulsivity symptoms. Client indicated that the reported symptoms resulted in impaired functioning in school and social relationships. Overall, the results suggest the client reported experiencing mild symptoms of inattention and hyperactivity/impulsivity as a child.     Childhood Symptoms Scale--Other Report:  Client's mother completed the collateral report inventory of childhood symptoms. Based on the collateral contact's recollection of client's childhood symptoms, the client demonstrated the following \"often\" or \"very often\": 3/9 Inattention symptoms and 5/9 Hyperactivity-Impulsivity symptoms. The client's Total ADHD Score was 44. The collateral contact indicated the client demonstrated impaired functioning in school, home, and social relationships. The collateral- and self-report scores are similar and suggest the client experienced some symptoms of inattention and hyperactivity/impulsivity in childhood.      Christian Functional Impairment Scale: Self and Other Reports (BFIS)  The BFIS is used to assess an individuals' psychosocial impairment in major life/daily activities that may be due to a mental health disorder. This assessment measure includes self " "and collateral rating scales. Self-report scores are reported as percentiles. Scores at the 76th-83rd percentile are considered marginal, scores at the 84th-92nd percentile are considered borderline, scores at the 93rd-95th percentile are considered mild, scores at the 96th-98th percentile are considered moderate, and those at the 99th percentile are considered severe. Collateral or \"other\" rating scales are reported as number of symptoms observed in comparison to those reported by the client. Norms and percentile scores are not available for collateral reports.      Results indicate the client identified impairment (scores at or greater than 93rd percentile) in the following areas: home-family, home-chores, social-strangers, social-friends, money management, sexual relations, daily responsibilities, self-care routines, health maintenance and childrearing. The client's Mean Impairment Score was 6.3 (96th percentile) indicating the client is reporting moderate impairment in functioning across domains. Client's fiancé completed the collateral rating scale, which indicated discrepant results. The collateral contact s scores were considerably lower than Client s scores (e.g., Mean Impairment Score of 2.4). Her fiancé only identified impairment in the following area: social-strangers.      Christian Deficits in Executive Functioning Scale (BDEFS)  The BDEFS is a measure used for evaluating dimensions of adult executive functioning in daily life. This assessment measure includes self and collateral rating scales. Self-report scores are reported as percentiles. Scores at the 76th-83rd percentile are considered marginal, scores at the 84th-92nd percentile are considered borderline, scores at the 93rd-95th percentile are considered mild, scores at the 96th-98th percentile are considered moderate, and those at the 99th percentile are considered severe. Collateral or \"other\" rating scales are reported as number of symptoms " observed in comparison to those reported by the client. Norms and percentile scores are not available for collateral reports.      Results indicate the client's Total Executive Functioning Score was 199 (93rd percentile). The ADHD-Executive Functioning Index score was 28 (96th percentile). These scores suggest the client has mild to moderate deficits in executive functioning. These deficits may be due to ADHD or another mental health disorder. Results indicate the client identified significant deficits in the following areas: self-management to time (moderate), self-organization/problem-solving (mild), and self-motivation (mild). Client's fiance completed the collateral rating scale which indicated discrepant results. He did not note deficits in any domains.      Summary of [place] Multiphasic Personality Inventory--Second Edition   Client completed the [place] Multiphasic Personality Inventory-2 (MMPI-2), a self-report personality inventory, as part of her evaluation. Validity scales indicate that the client responded in an open and consistent manner, resulting in a valid profile. While overreporting is possible, it is also likely that the Client has limited resources for coping with the stresses and demands of daily life. The following results should be interpreted with caution and in light of other information. Her responses reflect high levels of general emotional distress and disturbance common in outpatient and inpatient psychiatric populations. Individuals with similar profiles experience moderate to severe depression with tension, anxiety, worry, intrusive thoughts, insecurity, and apprehensiveness. Depression is manifested in dysphoria and sad affect, feelings of helplessness, hopelessness, worthlessness, pessimism and inadequacy. Guilt and themes of failure, uselessness, and being overwhelmed are common. Individuals with similar profiles may ruminate about personal shortcomings, over-anticipate  negative outcomes, and overreact to minor problems and mistakes. They may be withdrawn and introverted, feel awkward and self-conscious, and be easily embarrassed around others. They may lack drive, energy, interest, and motivation. They may also experience anxiety that inhibits the ability to complete normal tasks and duties. Anxiety may manifest as fear of mental collapse that is close to panic. They may feel stressed out and vulnerable to upset by disappointment or decisions that don t work out and dread that sudden unanticipated event will cause one to  go to pieces.  They may experience self-doubt, problems with concentration, memory, judgment, and decision making. They may also experience vegetative symptoms of depression such as anhedonia, sleep disturbance, and loss of interest, energy and motivation. They may experience a sense of mental failure or decline and the depletion of energy needed to accomplish mental work. Thinking and problem-solving are experienced as effortful and as subject to going off course even when significant effort is made. Thinking may be viewed as impaired or unreliable; they may have a sense that  I can t seem to get my mind right.     Generalized Anxiety Disorder Questionnaire (MINNA-7)  This questionnaire is designed to screen for anxiety in adults. Based on the client's score of 18, she is reporting severe symptoms of anxiety. Client identified the following symptoms of anxiety: feeling nervous, anxious, or on edge; not being able to stop or control worrying, worrying too much about different things, trouble relaxing, being so restless that it s hard to sit still, becoming easily annoyed or irritable, and feeling afraid as if something awful might happen.      Patient Health Questionnaire- 9 (PHQ-9)   This questionnaire is designed to screen for depression in adults. Based on the client's score of 10, she is reporting moderate symptoms of depression. Client identified the following  "symptoms of depression: little interest or pleasure in doing things, feeling down/depressed/hopeless, feeling tired or having little energy, feeling bad about self, trouble concentrating and feeling fidgety or restless.      Summary (based on clinical interview, review of records, test results):  Client is a 42-year-old, , partnered female. Client was referred for a diagnostic assessment by PCP, Dr. Donaldson and psychiatrist, Geraldine Brand. The purpose of this evaluation is to: provide treatment recommendations, clarify diagnosis and rule out ADHD vs Bipolar Disorder.  Client is currently employed full time. Client attended the session alone. Client's presenting concerns included:  Memory issues, concentration issues, organizational issues, restlessness, sleeplessness, anxiety.\"  Client stated that her symptoms have resulted in the following functional impairments: home life with family and friends, relationship(s), finances and  emotional well-being.  She reported the following symptoms of inattention: poor concentration, difficulty sustaining attention, listening and following through on instructions, poor time management, distractibility and forgetfulness. She reported the following symptoms of hyperactivity or impulsivity: fidgeting, restlessness, feeling  on the go  as if driven by a motor, talking excessively, difficulty waiting her turn in line, and interrupting others.    Client reported that she has not completed a previous ADHD diagnostic assessment. Client has received a previous diagnosis of Anxiety and Depression. Client has been prescribed medication to address these problems. Client reported that medication was not helpful and did not cause unpleasant side effects. Client reported that these problems with anxiety began in childhood. She stated, \"In fourth grade I was having some issues going on. I had a mental breakdown and so much anxiety that I was getting sick with stomach aches and I lost " "a lot of weight. I had a great teacher so she would let me have breakfast in the room even though you weren't supposed to do that. She also let me skip recess because I was so nervous and felt so uncomfortable around other kids so she let me stay inside to help her grade papers. I would end up in the nurse's office a lot. My mom ran out of vacation time trying to come get me so the nurse would just let me sleep in her office until my mom could come get me.\" Client has attempted to resolve these concerns in the past through medications from PCP, psychiatry, and counseling. She reported she was first diagnosed with anxiety and depression at age 19. She explained that she has been prescribed various medications including: Ativan, Buspar, Celexa, Effexor, Lexapro, Paxil, Prozac, Mirtazapine, Wellbutrin and Zoloft and Ambien. She reported that each medication \"helped at first, but then wore off.\" Hospitalizations: None. Client reported she had attempted to overdose with a bottle of Prozac in 1999. She stated, \"I had just quit my job and moved to [place] and learned that my  would be deployed overseas for a year. They didn't even keep me over night because they didn't think that I wanted to actually kill myself. They thought I just wanted attention. They might have been right, that I was doing it to stop him from leaving.\" She reported that she first participated in counseling at age 19 when she was first diagnosed with anxiety. She reported that she participated in individual therapy once while living in [place], twice since moving to [place] and estimated that the last time she participated in therapy was in 2010 following her divorce. Previous / current commitments: None. Client is currently receiving the following services: medication(s) from physician / PCP and psychiatry. She has tried many prescribed medications in the past including Ativan, Buspar, Celexa, Effexor, Lexapro, Paxil, Prozac, " "Mirtazapine, Wellbutrin and Zoloft. She is currently prescribed Seroquel for sleep. Upon inquiry, Client denied experiencing symptoms of debby or hypomania. She stated, \"There may have been times when depression was less but that was probably when anxiety was higher. I've never felt hyper and I've never felt invincible or done anything that is crazy or reckless.\"     Client reported she grew up in [place], [place]. Client was the fourth born of 4 children (her mother had a child before marrying Client's father; her father had two children before marrying Client's mother). Client's parents  when she was 11/14 years old. Her father remarried soon after the divorce and her mother remarried when she was 22 years old. Client reported that her childhood was \"fine\", stating, \"I just remember that I didn't want to go to sleep-overs or do that stuff ever. I did pretty well in school though.\" Client described her childhood family environment as dysfunctional. She stated, \"My parents fought constantly when they were around each other. My dad would make fun of my mom a lot and call her 'fat' a lot. My brother started drinking when he was like 13 so he had problems.\" As a child, client reported that she failed to complete assigned chores in the home environment, had problems getting ready for school in the morning, had problems with organization and keeping track of items, had problems managing temper with frequent emotional outbursts and had difficulty managing personal hygiene. She stated, \"It wasn't anger that I had, more like sadness. I was usually okay with a list to check off that helped me complete chores. I wouldn't shower regularly because I was always doing something else. I still do that.\" Client reported difficulty with childhood peer relationships. She described feeling anxious around other people and reported she had difficulty meeting people and making friends. She described herself as shy and withdrawn. Client " "described her current relationships with family of origin as inconsistent with behavior and communication.  She reported she is close with her mother but does not speak to her father or her half-sister. Client reported the following biological family members or relatives with mental health issues: Mother experienced Depression, Brother experienced Bipolar Disorder and Sister experienced Depression. Brother reportedly uses alcohol. Client has not received chemical dependency treatment in the past. Client is not currently receiving any chemical dependency treatment. Client reports no problems as a result of their drinking / drug use. Client reports using alcohol 3 times per week and has 6 beers at a time. Client first started drinking at age 18. Client stated, \"It helps my brain slow down a little. Sometimes it's just because my friends came over.\" She also reported drinking up to 12 caffeinated beverages per day. Client started using caffeine at age 12 and indicated that she drinks a 12 pack of soda per day and has done this for years.     Client reported a history of one marriage. She reported she  in 1999, stating, \"It was such a bad idea. I had only met the carlos three times and I never liked him. He was in the  and we had two kids together. I didn't know him and it was an impulsive thing. I shouldn't have done that.\" They  in 2011. Client has been partnered / significant other for 8 months and is engaged to her partner. They work together and have known each other for one year and they work together. Client reported having 2 children ages 11 and 13. Client identified some stable and meaningful social connections. Client reported that she has not been involved with the legal system.       Client's highest education level was high school graduate and associate degree / vocational certificate. Client reported she graduated from [employer] in 1995 with a 3.0-3.5 GPA. She reported " "obtaining As and Bs, stating, \"I did better in hands-on classes. I would put off homework until the last minute. I would get it done and I somehow got good grades. I did okay on tests but I would prefer to be sick and miss that day so I could take the test alone with more time. I would get distracted by people sniffling and the sounds of their pencils on the paper. I got nervous when people got up to turn theirs in.\" During the elementary, middle, and high school years, patient recalls academic strengths in the area of math, physical education and home-ec and hands on activities. Client reported experiencing academic problems in social studies and history and literature. She stated, \"I had a harder time reading and remembering things. It was hard to focus on things I didn't like.\" Client did identify the following learning problems: attention and concentration. She reported experiencing significant anxiety at a young age and reported having a \"mental breakdown\" in 4th grade which required that her mother and teacher allowed for \"special considerations.\" She stated, \"In 4th grade I was having some issues going on. I had a mental breakdown and so much anxiety that I was getting sick with stomach aches and I lost a lot of weight. I had a great teacher so she would let me have breakfast in the room even though you weren't supposed to do that. She also let me skip recess because I was so nervous and felt so uncomfortable around other kids so she let me stay inside to help her grade papers. I would end up in the nurse's office a lot. My mom ran out of vacation time trying to come get me so the nurse would just let me sleep in her office until my mom could come get me.\" Client did not receive tutoring services during the school years. Client did not receive special education services. Client reported no particular problems during the school years. She stated, \"I went through my old report cards and saw that teachers wrote " "that I wouldn't sit still and kept bugging other kids. But they said I was very smart and had potential.\" She described having difficulty with time management and getting things done, stating, \"I would wait until the last minute to do things but I tended to do well.\" Client did attend post-secondary school. She reported that she completed an associate's degree in Office and Administration from [employer] in 1996. She stated, \"It was a 2 year degree but I finished it in 15 months. I thought it was so dumb and easy. I did well and turned things in on time. I think I had a B average.\"      Client did not serve in the .  Client reported that she is currently employed as a . Client reported that the current job is a good fit for her skills and personality. Client reported that she often felt bored, often been late in completing projects, disorganized behavior, distractible behavior and poor time management. She stated,  I hate my job. 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 It s good for me to be moving around and how I feel actually makes me excellent at my job because I need to be able to multi-task. I can do 9 hours of work in 5 hours. I m always the first to complete my route because I am eager to get home and take care of things there.  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  The longest period of employment has been 15.5 years. Client has not been terminated from a place of employment.      Results of testing were not indicative of ADHD. The self and collateral report scales were quite discrepant. For instance, Client s self-report rating scales suggest that she is reporting moderate symptoms of inattention and severe symptoms of hyperactivity/impulsivity at this time; however, the " collateral source (her fiancé) did not report observing significant symptoms. While Client and her mother both noted symptoms were present in childhood, the onset of such symptoms overlaps with the timeframe during which Client described experiencing severe anxiety (e.g.,  mental breakdown  in fourth grade; recurrent stomach aches which led to staying in the nurse s office, etc.).  Similarly, Client s responses on self-report measures suggested moderate anxiety and severe depression. Personality testing was significant for depression with tension, anxiety, worry, intrusive thoughts, insecurity, and apprehensiveness. Client denied symptoms of debby/hypomania. Based on the results of clinical interview and psychological testing, the client does not meet criteria for ADHD or Bipolar Disorder at this time. She does currently meet DSM5 criteria for diagnoses of Major Depressive Disorder, Recurrent, Moderate and Generalized Anxiety Disorder. She likely experiences mental inertia common in anxiety and depression which likely exacerbates issues with concentration, thinking and decision making. If sufficiently treated, Client s attention and drive would likely improve. Additionally, Client s use of caffeine the role it plays in her functioning should be further assessed. A number of the symptoms Client reported overlap with the diagnosis of Caffeine Intoxication. As such, this condition is included as a  Rule Out  diagnosis for which further assessment/monitoring is recommended. Client will be provided with the results of testing, diagnoses, and recommendations in her last appointment. Client was provided with the results of testing, diagnosis, and recommendations in her last appointment.     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 She recalled experiencing severe anxiety as early as 4th grade. She has tried multiple antidepressants and is working with PCP and psychiatry. She described having difficulty completing tasks and being organized at home.  WHODAS 2.0 (12 item) Raw Score: 40     Recommendations:    1. Schedule an appointment with your physician or psychiatrist to discuss a medication evaluation. Medications are often beneficial in treating anxiety and depressive symptoms. It is important to take your medication on a consistent basis.    2. It is recommended that caffeine intake is reduced. Use of this substance may be contributing to and/or exacerbating your experience with physical symptoms of anxiety (e.g., restlessness, nervousness, insomnia, and psychomotor agitation are symptoms of caffeine intoxication).     3. Individual therapy is recommended. Therapies focused on identifying and challenging problematic thought and behavior patterns while increasing the use of healthy coping skills has been found to be effective in treating anxiety and depression. It will be important to set goals in this therapy and work actively toward achieving short-term successes that lead to the completion of each goal. Action-oriented therapies, such as CBT or DBT, are particularly recommended for the treatment of chronic depression and anxiety.    4. The use of behavioral strategies such as diaphragmatic breathing, guided imagery, and mindfulness is often helpful in the management of anxiety and depressive symptoms.     5. The following compensatory strategies may be useful to cope with reported inattention symptoms:   a. Maintaining a predictable routine and structured environment that incorporates prioritized checklists and reminders (e.g., Post-Its).  b. When completing tasks, try to focus on one task at a time and complete it in its entirety  before moving on to the next task.  c. Minimize background distractions when working on complex tasks. For example, TV, radio or ongoing conversations in the background may hinder ability to focus on the task at hand.  d. Take regular breaks from tasks that require prolonged attention. In general, regular breaks from complex tasks can help prevent lapses in attention, which can result in errors.  e. Outline the steps required to complete a task prior to beginning it, which can help ensure an organized approach. Use the outline to refer to throughout the task as a reminder of the steps to be completed.    6. If the anxiety and depression are adequately treated and you continue to have difficulty with inattention and hyperactivity/impulsivity, a re-evaluation may be warranted.       Anaya Pham, PhD, LP  Licensed Psychologist

## 2018-01-11 DIAGNOSIS — G47.00 PERSISTENT INSOMNIA: ICD-10-CM

## 2018-01-11 NOTE — TELEPHONE ENCOUNTER
"QUETIAPINE FUMARATE 50 MG TAB        Last Written Prescription Date:  11/7/17  Last Fill Quantity: 60,   # refills: 1  Last Office Visit: 9/5/17  Future Office visit:       Requested Prescriptions   Pending Prescriptions Disp Refills     QUEtiapine (SEROQUEL) 50 MG tablet [Pharmacy Med Name: QUETIAPINE FUMARATE 50 MG TAB] 60 tablet 0     Sig: START WITH 1 TABLET AT BEDTIME MAY INCREASE TO 2 IF TOLERATED    Antipsychotic Medications Failed    1/11/2018  1:06 AM       Failed - Lipid panel on file within the past 12 months    No lab results found.           Failed - CBC on file in past 12 months    No lab results found.         Failed - A1c or Glucose on file in past 12 months    No lab results found.    Please review patients last 3 weights. If a weight gain of >10 lbs exists, you may refill the prescription once after instructing the patient to schedule an appointment within the next 30 days.    Wt Readings from Last 3 Encounters:   09/11/17 144 lb 12.8 oz (65.7 kg)   09/05/17 143 lb 3.2 oz (65 kg)            Passed - BP is less then 140/90    BP Readings from Last 3 Encounters:   09/11/17 107/71   09/05/17 108/70   07/09/12 125/70                Passed - Patient is 12 years of age or older       Passed - Heart Rate on file within past 12 months    Pulse Readings from Last 3 Encounters:   09/11/17 78   09/05/17 76              Passed - Patient is not pregnant       Passed - No positve pregnancy test on file in past 12 months       Passed - Recent or future visit with authorizing provider's specialty    Patient had office visit in the last 6 months or has a visit in the next 30 days with authorizing provider.  See \"Patient Info\" tab in inbasket, or \"Choose Columns\" in Meds & Orders section of the refill encounter.             "

## 2018-01-12 RX ORDER — QUETIAPINE FUMARATE 50 MG/1
50 TABLET, FILM COATED ORAL AT BEDTIME
Qty: 60 TABLET | Refills: 0 | Status: SHIPPED | OUTPATIENT
Start: 2018-01-12 | End: 2018-02-10

## 2018-01-12 NOTE — TELEPHONE ENCOUNTER
Medication is being filled for 1 time refill only due to:  Patient needs to be seen because due for follow up .  Breana Arenas RN

## 2018-01-17 ENCOUNTER — OFFICE VISIT (OUTPATIENT)
Dept: FAMILY MEDICINE | Facility: CLINIC | Age: 43
End: 2018-01-17
Payer: COMMERCIAL

## 2018-01-17 VITALS
TEMPERATURE: 98.8 F | WEIGHT: 146.5 LBS | DIASTOLIC BLOOD PRESSURE: 73 MMHG | HEIGHT: 63 IN | BODY MASS INDEX: 25.96 KG/M2 | HEART RATE: 64 BPM | SYSTOLIC BLOOD PRESSURE: 125 MMHG

## 2018-01-17 DIAGNOSIS — Z23 NEED FOR PROPHYLACTIC VACCINATION AND INOCULATION AGAINST INFLUENZA: Primary | ICD-10-CM

## 2018-01-17 DIAGNOSIS — F39 MOOD DISORDER (H): ICD-10-CM

## 2018-01-17 DIAGNOSIS — G47.00 PERSISTENT INSOMNIA: ICD-10-CM

## 2018-01-17 PROCEDURE — 90471 IMMUNIZATION ADMIN: CPT | Performed by: FAMILY MEDICINE

## 2018-01-17 PROCEDURE — 99214 OFFICE O/P EST MOD 30 MIN: CPT | Mod: 25 | Performed by: FAMILY MEDICINE

## 2018-01-17 PROCEDURE — 90686 IIV4 VACC NO PRSV 0.5 ML IM: CPT | Performed by: FAMILY MEDICINE

## 2018-01-17 RX ORDER — LAMOTRIGINE 100 MG/1
TABLET ORAL
Qty: 21 TABLET | Refills: 0 | Status: SHIPPED | OUTPATIENT
Start: 2018-01-17 | End: 2024-09-24

## 2018-01-17 RX ORDER — QUETIAPINE FUMARATE 100 MG/1
100 TABLET, FILM COATED ORAL AT BEDTIME
Qty: 90 TABLET | Refills: 1 | Status: SHIPPED | OUTPATIENT
Start: 2018-01-17

## 2018-01-17 ASSESSMENT — ANXIETY QUESTIONNAIRES
GAD7 TOTAL SCORE: 14
1. FEELING NERVOUS, ANXIOUS, OR ON EDGE: MORE THAN HALF THE DAYS
7. FEELING AFRAID AS IF SOMETHING AWFUL MIGHT HAPPEN: SEVERAL DAYS
6. BECOMING EASILY ANNOYED OR IRRITABLE: SEVERAL DAYS
2. NOT BEING ABLE TO STOP OR CONTROL WORRYING: MORE THAN HALF THE DAYS
3. WORRYING TOO MUCH ABOUT DIFFERENT THINGS: MORE THAN HALF THE DAYS
5. BEING SO RESTLESS THAT IT IS HARD TO SIT STILL: NEARLY EVERY DAY

## 2018-01-17 ASSESSMENT — PATIENT HEALTH QUESTIONNAIRE - PHQ9
SUM OF ALL RESPONSES TO PHQ QUESTIONS 1-9: 9
5. POOR APPETITE OR OVEREATING: NEARLY EVERY DAY

## 2018-01-17 NOTE — NURSING NOTE
"Chief Complaint   Patient presents with     Recheck Medication       Initial /73  Pulse 64  Temp 98.8  F (37.1  C) (Tympanic)  Ht 5' 3.25\" (1.607 m)  Wt 146 lb 8 oz (66.5 kg)  BMI 25.75 kg/m2 Estimated body mass index is 25.75 kg/(m^2) as calculated from the following:    Height as of this encounter: 5' 3.25\" (1.607 m).    Weight as of this encounter: 146 lb 8 oz (66.5 kg).  Medication Reconciliation: complete   Donna Johnston CMA    "

## 2018-01-17 NOTE — MR AVS SNAPSHOT
"              After Visit Summary   1/17/2018    Kenya Zurita    MRN: [id number]           Patient Information     Date Of Birth          1975        Visit Information        Provider Department      1/17/2018 2:30 PM Yen Donaldson MD [facility]        Today's Diagnoses     Need for prophylactic vaccination and inoculation against influenza    -  1    Persistent insomnia        Mood disorder (H)           Follow-ups after your visit        Who to contact     Normal or non-critical lab and imaging results will be communicated to you by [employer], letter or phone within 4 business days after the clinic has received the results. If you do not hear from us within 7 days, please contact the clinic through [employer] or phone. If you have a critical or abnormal lab result, we will notify you by phone as soon as possible.  Submit refill requests through [employer] or call your pharmacy and they will forward the refill request to us. Please allow 3 business days for your refill to be completed.          If you need to speak with a  for additional information , please call: [phone]             Additional Information About Your Visit        [employer] Information     [employer] gives you secure access to your electronic health record. If you see a primary care provider, you can also send messages to your care team and make appointments. If you have questions, please call your primary care clinic.  If you do not have a primary care provider, please call [phone] and they will assist you.        Care EveryWhere ID     This is your Care EveryWhere ID. This could be used by other organizations to access your [facility] medical records  [id number]        Your Vitals Were     Pulse Temperature Height BMI (Body Mass Index)          64 98.8  F (37.1  C) (Tympanic) 5' 3.25\" (1.607 m) 25.75 kg/m2         Blood Pressure from Last 3 Encounters:   01/17/18 125/73   09/11/17 107/71   09/05/17 108/70    " Weight from Last 3 Encounters:   01/17/18 146 lb 8 oz (66.5 kg)   09/11/17 144 lb 12.8 oz (65.7 kg)   09/05/17 143 lb 3.2 oz (65 kg)              We Performed the Following     FLU VAC, SPLIT VIRUS IM > 3 YO (QUADRIVALENT) [50576]     Vaccine Administration, Initial [06593]          Today's Medication Changes          These changes are accurate as of: 1/17/18  3:08 PM.  If you have any questions, ask your nurse or doctor.               Start taking these medicines.        Dose/Directions    lamoTRIgine 100 MG tablet   Commonly known as:  LAMICTAL   Used for:  Mood disorder (H)   Started by:  Yen Donaldson MD        Take 1/2 tablet daily for 2 weeks, then 1/2 tablet twice daily   Quantity:  21 tablet   Refills:  0         These medicines have changed or have updated prescriptions.        Dose/Directions    * QUEtiapine 50 MG tablet   Commonly known as:  SEROquel   This may have changed:  Another medication with the same name was added. Make sure you understand how and when to take each.   Used for:  Persistent insomnia   Changed by:  Yen Donaldson MD        Dose:  50 mg   Take 1 tablet (50 mg) by mouth At Bedtime May increase to 2 tablets if needed   Quantity:  60 tablet   Refills:  0       * QUEtiapine 100 MG tablet   Commonly known as:  SEROquel   This may have changed:  You were already taking a medication with the same name, and this prescription was added. Make sure you understand how and when to take each.   Used for:  Persistent insomnia   Changed by:  Yen Donaldson MD        Dose:  100 mg   Take 1 tablet (100 mg) by mouth At Bedtime   Quantity:  90 tablet   Refills:  1       * Notice:  This list has 2 medication(s) that are the same as other medications prescribed for you. Read the directions carefully, and ask your doctor or other care provider to review them with you.         Where to get your medicines      These medications were sent to [facility] [address] [place] [address]    [address] [place] [address]     Phone:  [phone]     lamoTRIgine 100 MG tablet    QUEtiapine 100 MG tablet                Primary Care Provider Office Phone # Fax #    Briseyda Michael [phone]       [facility] [address] [facility] [address]        Equal Access to Services     SRI LANGFORD : Hadii aad ku hadasho Soomaali, waaxda luqadaha, qaybta kaalmada adeegyada, waxmino rosein hayaan adeclay roweyadijuly haney . So [facility] [phone].    ATENCIÓN: Si habla español, tiene a mercado disposición servicios gratuitos de asistencia lingüística. [facility] [phone].    We comply with applicable federal civil rights laws and [place] laws. We do not discriminate on the basis of race, color, national origin, age, disability, sex, sexual orientation, or gender identity.            Thank you!     Thank you for choosing [facility]  for your care. Our goal is always to provide you with excellent care. Hearing back from our patients is one way we can continue to improve our services. Please take a few minutes to complete the written survey that you may receive in the mail after your visit with us. Thank you!             Your Updated Medication List - Protect others around you: Learn how to safely use, store and throw away your medicines at www.disposemymeds.org.          This list is accurate as of: 1/17/18  3:08 PM.  Always use your most recent med list.                   Brand Name Dispense Instructions for use Diagnosis    CALCIUM PO           cyanocobalamin 1000 MCG/ML injection    VITAMIN B12     Inject 1 mL into the muscle every 7 days        lamoTRIgine 100 MG tablet    LAMICTAL    21 tablet    Take 1/2 tablet daily for 2 weeks, then 1/2 tablet twice daily    Mood disorder (H)       levonorgestrel 20 MCG/24HR IUD    MIRENA     1 Device by Intrauterine route        MULTI-VITAMIN DAILY PO           * QUEtiapine 50 MG tablet    SEROquel    60 tablet    Take 1 tablet  (50 mg) by mouth At Bedtime May increase to 2 tablets if needed    Persistent insomnia       * QUEtiapine 100 MG tablet    SEROquel    90 tablet    Take 1 tablet (100 mg) by mouth At Bedtime    Persistent insomnia       VITAMIN D PO           * Notice:  This list has 2 medication(s) that are the same as other medications prescribed for you. Read the directions carefully, and ask your doctor or other care provider to review them with you.

## 2018-01-17 NOTE — PROGRESS NOTES
"SUBJECTIVE:                                                    Kenya Zurita is a 42 year old female who presents to clinic today for the following health issues:    Medication Followup of Seroquel    Taking Medication as prescribed: yes    Side Effects:  None    Medication Helping Symptoms:  yes     PHQ-9 SCORE 9/11/2017 10/13/2017 1/17/2018   Total Score 14 18 9     MINNA-7 SCORE 9/11/2017 10/13/2017 1/17/2018   Total Score 18 21 14       Problem list and histories reviewed & adjusted, as indicated.  Additional history: she has seen Geraldine Brand but she was sent for more testing . She does not fit this parameters for adhd  She was frustrated with the evaluation process . She is not getting anything done   Feels defeated she thinks she should have the testing over since she felt that it was not done correctly for her.  She has not followed up with Geraldine she is here instead. She is sure she just needs a stimulant or that it is not her mood that she is not depressed or really anxious as the medications for \"those don't work\"   The seroquel has been helping with her sleep she is taking just the one at bedtime  Denies hallucinations no self harm or thoughts of other harm        There is no problem list on file for this patient.    Past Surgical History:   Procedure Laterality Date     GALLBLADDER SURGERY  04/2004     GASTRIC BYPASS  2009     HERNIA REPAIR  11/2014    with Tummy tuck       Social History   Substance Use Topics     Smoking status: Never Smoker     Smokeless tobacco: Never Used     Alcohol use Yes     Family History   Problem Relation Age of Onset     Other Cancer Mother      [place]     Depression Mother      Other Cancer Father      Skin     Colon Cancer Maternal Grandfather      Other Cancer Paternal Grandfather      Brain cancer     MENTAL ILLNESS Brother      Bipolar Disorder Brother      MENTAL ILLNESS Sister      Depression Sister      Asthma Son            ROS:  Constitutional, HEENT, " "cardiovascular, pulmonary, gi and gu systems are negative, except as otherwise noted.    OBJECTIVE:                                                    /73  Pulse 64  Temp 98.8  F (37.1  C) (Tympanic)  Ht 5' 3.25\" (1.607 m)  Wt 146 lb 8 oz (66.5 kg)  BMI 25.75 kg/m2 Body mass index is 25.75 kg/(m^2).   GENERAL: healthy, alert, well nourished, well hydrated, no distress  HENT: ear canals- normal; TMs- normal; Nose- normal; Mouth- no ulcers, no lesions  NECK: no tenderness, no adenopathy, no asymmetry, no masses, no stiffness; thyroid- normal to palpation  RESP: lungs clear to auscultation - no rales, no rhonchi, no wheezes  CV: regular rates and rhythm, normal S1 S2, no S3 or S4 and no murmur, no click or rub -  MENTAL STATUS EXAM:    1. Clinical observations: Kenya was   clean and was adequately groomed. Kenya's emotional presentation was open and cooperative. She spoke clear and articulate and talkative/verbose. She maintained fair eye contact and she was cooperative in answering questions.   2. She appeared to be well-oriented in all spheres with coherent, logical, goal directed and relevent thinking.   3. Thought content: She denies no abnormal thought process.   4. Affect and mood: Kenya's affect is described as anxious and tearful and her emotional attitude was open and cooperative. She reports the following sypmtoms: difficulty sleeping, drawing away from people, feeling fatiqued, feeling easily distracted, hard to trust anyone, too much worry, fear of losing control, nervous or tense feeling, feeling angry or frustrated, dwelling on problems and poor memory.    5. Sensorium and cognition: She was in contact with reality and oriented to time, place and person.  She demonstrated no impairment in immediate, recent, or remote memory. Her insight was minimal and her  intelligence appeared to be average.         ASSESSMENT/PLAN:                                                      1. Persistent " insomnia  This will likely also help with her anxiety   - QUEtiapine (SEROQUEL) 100 MG tablet; Take 1 tablet (100 mg) by mouth At Bedtime  Dispense: 90 tablet; Refill: 1    2. Need for prophylactic vaccination and inoculation against influenza    - FLU VAC, SPLIT VIRUS IM > 3 YO (QUADRIVALENT) [94124]  - Vaccine Administration, Initial [49809]    3. Mood disorder (H)reviewed last psych notes will have her start this as this was the next plan. She should follow up with Geraldine Brand in the next 4 weeks or so. To assess the lamictal. Call with any side effects   - lamoTRIgine (LAMICTAL) 100 MG tablet; Take 1/2 tablet daily for 2 weeks, then 1/2 tablet twice daily  Dispense: 21 tablet; Refill: 0     reports that she has never smoked. She has never used smokeless tobacco.          Yen Donaldson M.D.  [facility]      Injectable Influenza Immunization Documentation  1.  Is the person to be vaccinated sick today?   No  2. Does the person to be vaccinated have an allergy to a component   of the vaccine?   No  Egg Allergy Algorithm Link  3. Has the person to be vaccinated ever had a serious reaction   to influenza vaccine in the past?   No  4. Has the person to be vaccinated ever had Guillain-Barré syndrome?   No  Form completed by Donna Johnston CMA

## 2018-01-19 ASSESSMENT — ANXIETY QUESTIONNAIRES: GAD7 TOTAL SCORE: 14

## 2018-02-07 DIAGNOSIS — F39 MOOD DISORDER (H): ICD-10-CM

## 2018-02-10 DIAGNOSIS — G47.00 PERSISTENT INSOMNIA: ICD-10-CM

## 2018-02-12 RX ORDER — QUETIAPINE FUMARATE 50 MG/1
TABLET, FILM COATED ORAL
Qty: 60 TABLET | Refills: 0 | Status: SHIPPED | OUTPATIENT
Start: 2018-02-12

## 2018-02-12 NOTE — TELEPHONE ENCOUNTER
"QUETIAPINE FUMARATE 50 MG TAB        Last Written Prescription Date:  1/12/18  Last Fill Quantity: 60,   # refills: 0  Last Office Visit: 1/17/18  Future Office visit:         Requested Prescriptions   Pending Prescriptions Disp Refills     QUEtiapine (SEROQUEL) 50 MG tablet [Pharmacy Med Name: QUETIAPINE FUMARATE 50 MG TAB] 60 tablet 0     Sig: TAKE 1 TABLET (50 MG) BY MOUTH AT BEDTIME MAY INCREASE TO 2 TABLETS IF NEEDED    Antipsychotic Medications Failed    2/10/2018  1:33 AM       Failed - Lipid panel on file within the past 12 months    No lab results found.           Failed - CBC on file in past 12 months    No lab results found.         Failed - A1c or Glucose on file in past 12 months    No lab results found.    Please review patients last 3 weights. If a weight gain of >10 lbs exists, you may refill the prescription once after instructing the patient to schedule an appointment within the next 30 days.    Wt Readings from Last 3 Encounters:   01/17/18 146 lb 8 oz (66.5 kg)   09/11/17 144 lb 12.8 oz (65.7 kg)   09/05/17 143 lb 3.2 oz (65 kg)            Passed - BP is less then 140/90    BP Readings from Last 3 Encounters:   01/17/18 125/73   09/11/17 107/71   09/05/17 108/70                Passed - Patient is 12 years of age or older       Passed - Heart Rate on file within past 12 months    Pulse Readings from Last 3 Encounters:   01/17/18 64   09/11/17 78   09/05/17 76              Passed - Patient is not pregnant       Passed - No positve pregnancy test on file in past 12 months       Passed - Recent or future visit with authorizing provider's specialty    Patient had office visit in the last 6 months or has a visit in the next 30 days with authorizing provider.  See \"Patient Info\" tab in inbasket, or \"Choose Columns\" in Meds & Orders section of the refill encounter.              "

## 2018-02-12 NOTE — TELEPHONE ENCOUNTER
Routing refill request to provider for review/approval because:  Drug not on the [facility] refill protocol   Brian Olivera RN

## 2018-02-25 RX ORDER — LAMOTRIGINE 100 MG/1
TABLET ORAL
Qty: 21 TABLET | Refills: 0 | OUTPATIENT
Start: 2018-02-25

## 2018-07-17 ENCOUNTER — TELEPHONE (OUTPATIENT)
Dept: FAMILY MEDICINE | Facility: CLINIC | Age: 43
End: 2018-07-17

## 2018-07-17 DIAGNOSIS — G47.00 PERSISTENT INSOMNIA: ICD-10-CM

## 2018-07-17 NOTE — TELEPHONE ENCOUNTER
I left a message for the patient to return my call.  Who is her primary provider?    Stephanie PHAN RN

## 2018-07-17 NOTE — TELEPHONE ENCOUNTER
"Requested Prescriptions   Pending Prescriptions Disp Refills     QUEtiapine (SEROQUEL) 100 MG tablet [Pharmacy Med Name: QUETIAPINE FUMARATE 100 MG TAB] 90 tablet 1    Last Written Prescription Date:  2/12/18  Last Fill Quantity: 60,  # refills: 0   Last office visit: 1/17/2018 with prescribing provider:  1/17/18 montserrat   Future Office Visit:     Sig: TAKE 1 TABLET (100 MG) BY MOUTH AT BEDTIME    Antipsychotic Medications Failed    7/17/2018  1:53 AM       Failed - Lipid panel on file within the past 12 months    No lab results found.           Failed - CBC on file in past 12 months    No lab results found.    For [facility] ONLY: [id number] = WBC, [id number] = RBC         Failed - A1c or Glucose on file in past 12 months    No lab results found.    Please review patients last 3 weights. If a weight gain of >10 lbs exists, you may refill the prescription once after instructing the patient to schedule an appointment within the next 30 days.    Wt Readings from Last 3 Encounters:   01/17/18 146 lb 8 oz (66.5 kg)   09/11/17 144 lb 12.8 oz (65.7 kg)   09/05/17 143 lb 3.2 oz (65 kg)            Failed - Recent (6 mo) or future (30 days) visit within the authorizing provider's specialty    Patient had office visit in the last 6 months or has a visit in the next 30 days with authorizing provider or within the authorizing provider's specialty.  See \"Patient Info\" tab in inbasket, or \"Choose Columns\" in Meds & Orders section of the refill encounter.           Passed - Blood pressure under 140/90 in past 12 months    BP Readings from Last 3 Encounters:   01/17/18 125/73   09/11/17 107/71   09/05/17 108/70                Passed - Patient is 12 years of age or older       Passed - Heart Rate on file within past 12 months    Pulse Readings from Last 3 Encounters:   01/17/18 64   09/11/17 78   09/05/17 76              Passed - Patient is not pregnant       Passed - No positve pregnancy test on file in past 12 months          "

## 2018-07-19 NOTE — TELEPHONE ENCOUNTER
ML for pt to call back NEEDS appt with [place] if wants refill from her --see note  YAZ Vanegas  Clinic  RN/Aristeo Sinha

## 2018-07-20 RX ORDER — QUETIAPINE FUMARATE 100 MG/1
TABLET, FILM COATED ORAL
Qty: 90 TABLET | Refills: 1 | OUTPATIENT
Start: 2018-07-20

## 2018-07-20 NOTE — TELEPHONE ENCOUNTER
Message left on personal voicemail she is due to be seen and will need to schedule for a refill if we are still her PCP clinic.  It looks like the PCP listed is an [facility] provider.    Vane Chacko RN

## 2019-10-02 ENCOUNTER — HEALTH MAINTENANCE LETTER (OUTPATIENT)
Age: 44
End: 2019-10-02

## 2020-03-22 ENCOUNTER — HEALTH MAINTENANCE LETTER (OUTPATIENT)
Age: 45
End: 2020-03-22

## 2021-01-15 ENCOUNTER — HEALTH MAINTENANCE LETTER (OUTPATIENT)
Age: 46
End: 2021-01-15

## 2021-09-04 ENCOUNTER — HEALTH MAINTENANCE LETTER (OUTPATIENT)
Age: 46
End: 2021-09-04

## 2021-09-05 ENCOUNTER — HOSPITAL ENCOUNTER (EMERGENCY)
Facility: CLINIC | Age: 46
Discharge: HOME | End: 2021-09-05
Attending: STUDENT IN AN ORGANIZED HEALTH CARE EDUCATION/TRAINING PROGRAM | Admitting: STUDENT IN AN ORGANIZED HEALTH CARE EDUCATION/TRAINING PROGRAM
Payer: COMMERCIAL

## 2021-09-05 ENCOUNTER — APPOINTMENT (OUTPATIENT)
Dept: CT IMAGING | Facility: CLINIC | Age: 46
End: 2021-09-05
Attending: STUDENT IN AN ORGANIZED HEALTH CARE EDUCATION/TRAINING PROGRAM
Payer: COMMERCIAL

## 2021-09-05 VITALS
RESPIRATION RATE: 17 BRPM | WEIGHT: 145 LBS | OXYGEN SATURATION: 98 % | DIASTOLIC BLOOD PRESSURE: 76 MMHG | HEART RATE: 66 BPM | TEMPERATURE: 97.8 F | BODY MASS INDEX: 25.48 KG/M2 | SYSTOLIC BLOOD PRESSURE: 119 MMHG

## 2021-09-05 DIAGNOSIS — R56.9 SEIZURE-LIKE ACTIVITY (H): ICD-10-CM

## 2021-09-05 PROBLEM — F41.1 GAD (GENERALIZED ANXIETY DISORDER): Status: ACTIVE | Noted: 2020-01-06

## 2021-09-05 PROBLEM — H11.009 PTERYGIUM EYE: Status: ACTIVE | Noted: 2021-09-05

## 2021-09-05 PROBLEM — R87.612 LGSIL ON PAP SMEAR OF CERVIX: Status: ACTIVE | Noted: 2021-09-05

## 2021-09-05 PROBLEM — F10.11 HISTORY OF ALCOHOL ABUSE: Status: ACTIVE | Noted: 2021-02-22

## 2021-09-05 LAB
ALBUMIN SERPL-MCNC: 3.2 G/DL (ref 3.4–5)
ALP SERPL-CCNC: 58 U/L (ref 40–150)
ALT SERPL W P-5'-P-CCNC: 22 U/L (ref 0–50)
ANION GAP SERPL CALCULATED.3IONS-SCNC: 4 MMOL/L (ref 3–14)
AST SERPL W P-5'-P-CCNC: 15 U/L (ref 0–45)
BASOPHILS # BLD AUTO: 0 10E3/UL (ref 0–0.2)
BASOPHILS NFR BLD AUTO: 1 %
BILIRUB SERPL-MCNC: 0.2 MG/DL (ref 0.2–1.3)
BUN SERPL-MCNC: 14 MG/DL (ref 7–30)
CALCIUM SERPL-MCNC: 8 MG/DL (ref 8.5–10.1)
CHLORIDE BLD-SCNC: 114 MMOL/L (ref 94–109)
CO2 SERPL-SCNC: 25 MMOL/L (ref 20–32)
CREAT SERPL-MCNC: 0.6 MG/DL (ref 0.52–1.04)
EOSINOPHIL # BLD AUTO: 0.1 10E3/UL (ref 0–0.7)
EOSINOPHIL NFR BLD AUTO: 1 %
ERYTHROCYTE [DISTWIDTH] IN BLOOD BY AUTOMATED COUNT: 13.4 % (ref 10–15)
GFR SERPL CREATININE-BSD FRML MDRD: >90 ML/MIN/1.73M2
GLUCOSE BLD-MCNC: 87 MG/DL (ref 70–99)
HCG SERPL QL: NEGATIVE
HCT VFR BLD AUTO: 37 % (ref 35–47)
HGB BLD-MCNC: 12.1 G/DL (ref 11.7–15.7)
IMM GRANULOCYTES # BLD: 0 10E3/UL
IMM GRANULOCYTES NFR BLD: 0 %
LYMPHOCYTES # BLD AUTO: 1.7 10E3/UL (ref 0.8–5.3)
LYMPHOCYTES NFR BLD AUTO: 24 %
MCH RBC QN AUTO: 29.4 PG (ref 26.5–33)
MCHC RBC AUTO-ENTMCNC: 32.7 G/DL (ref 31.5–36.5)
MCV RBC AUTO: 90 FL (ref 78–100)
MONOCYTES # BLD AUTO: 0.5 10E3/UL (ref 0–1.3)
MONOCYTES NFR BLD AUTO: 8 %
NEUTROPHILS # BLD AUTO: 4.7 10E3/UL (ref 1.6–8.3)
NEUTROPHILS NFR BLD AUTO: 66 %
NRBC # BLD AUTO: 0 10E3/UL
NRBC BLD AUTO-RTO: 0 /100
PLATELET # BLD AUTO: 344 10E3/UL (ref 150–450)
POTASSIUM BLD-SCNC: 3.9 MMOL/L (ref 3.4–5.3)
PROT SERPL-MCNC: 6.9 G/DL (ref 6.8–8.8)
RBC # BLD AUTO: 4.12 10E6/UL (ref 3.8–5.2)
SODIUM SERPL-SCNC: 143 MMOL/L (ref 133–144)
WBC # BLD AUTO: 7.1 10E3/UL (ref 4–11)

## 2021-09-05 PROCEDURE — 70450 CT HEAD/BRAIN W/O DYE: CPT

## 2021-09-05 PROCEDURE — 36415 COLL VENOUS BLD VENIPUNCTURE: CPT | Performed by: STUDENT IN AN ORGANIZED HEALTH CARE EDUCATION/TRAINING PROGRAM

## 2021-09-05 PROCEDURE — 85025 COMPLETE CBC W/AUTO DIFF WBC: CPT | Performed by: STUDENT IN AN ORGANIZED HEALTH CARE EDUCATION/TRAINING PROGRAM

## 2021-09-05 PROCEDURE — 99285 EMERGENCY DEPT VISIT HI MDM: CPT | Mod: 25 | Performed by: STUDENT IN AN ORGANIZED HEALTH CARE EDUCATION/TRAINING PROGRAM

## 2021-09-05 PROCEDURE — 80053 COMPREHEN METABOLIC PANEL: CPT | Performed by: STUDENT IN AN ORGANIZED HEALTH CARE EDUCATION/TRAINING PROGRAM

## 2021-09-05 PROCEDURE — 93010 ELECTROCARDIOGRAM REPORT: CPT | Performed by: STUDENT IN AN ORGANIZED HEALTH CARE EDUCATION/TRAINING PROGRAM

## 2021-09-05 PROCEDURE — 93005 ELECTROCARDIOGRAM TRACING: CPT | Performed by: STUDENT IN AN ORGANIZED HEALTH CARE EDUCATION/TRAINING PROGRAM

## 2021-09-05 PROCEDURE — 84703 CHORIONIC GONADOTROPIN ASSAY: CPT | Performed by: STUDENT IN AN ORGANIZED HEALTH CARE EDUCATION/TRAINING PROGRAM

## 2021-09-05 NOTE — ED PROVIDER NOTES
History     Chief Complaint   Patient presents with     Seizures     HPI  Keyna Zurita is a 46 year old female who presents to the department for evaluation after witnessed seizure-like activity.  Patient is now alert and oriented in the department but does not recall the episode.  Her partner in the department describes the patient leaning against the kitchen counter, eyes rolled back and he helped her to the floor before witnessing a 10-second seizure with postictal confusion.  Episode occurred at around 4 PM.  Patient denies any history of seizures.  She says that she had a mild cough last week but recent Covid testing negative.  She is now without fever, chills, headache, cough, shortness of breath, abdominal pain, or gastrointestinal symptoms.  No recent alcohol or illicit drug use, although does note that she feels tired and sleep deprived from working so much.    Allergies:  No Known Allergies    Problem List:    Patient Active Problem List    Diagnosis Date Noted     LGSIL on Pap smear of cervix 09/05/2021     Priority: Medium     Formatting of this note might be different from the original.  8/12/2014; 09/08/2015 - HPV negative   [place] 10/01/2015 - CHAD 1-> repeat pap/HPV in 1 year   10/1/2016: Pap NIL/HPV Negative  Plan: Pap and HPV in 3 years~ due 10/2019  1/2020:  Pap NIL/HPV Negative  Plan: Routine screening       Pterygium eye 09/05/2021     Priority: Medium     History of alcohol abuse 02/22/2021     Priority: Medium     Formatting of this note might be different from the original.  Abstinent since 11/2021       MINNA (generalized anxiety disorder) 01/06/2020     Priority: Medium     Formatting of this note might be different from the original.  Duloxetine (1/6/2020)       Other and unspecified postsurgical nonabsorption      Priority: Medium     Insomnia 03/27/2015     Priority: Medium     Depression      Priority: Medium     Anxiety      Priority: Medium     History of gastric bypass 05/22/2012      "Priority: Medium     Formatting of this note might be different from the original.  2009   125# weight loss       History of Harish-en-Y gastric bypass 09/29/2009     Priority: Medium     Dr. Beverly Initial weight 256.5 BMI 44.7         Presence of intrauterine contraceptive device (IUD) 07/16/2009     Priority: Medium     Formatting of this note might be different from the original.  Placed 10/31/11, replaced 9/16/2016       Obstructive sleep apnea (adult) (pediatric) 02/03/2007     Priority: Medium     Formatting of this note might be different from the original.  Sleep study 12/05, repeat study 9/09       Allergic rhinitis 02/03/2007     Priority: Medium     Major depressive disorder, recurrent, moderate (H) 02/03/2007     Priority: Medium     Formatting of this note might be different from the original.  Duloxetine (1/6/2020):     h/o hosp for suicide attempt in 2000    Prev Meds Tried: Fluoxetine not effective; citalopram; sertraline effective at first, but less effective with time;  Effexor \"okay\".  Paxil started 8/2014.  Increased to 30mg 2/2015.   Weight gain and felt icky;   BuSpar aug (3/27/2015) not helpful          Past Medical History:    Past Medical History:   Diagnosis Date     Anxiety      Depression      Suicide attempt (H) 2001       Past Surgical History:    Past Surgical History:   Procedure Laterality Date     GALLBLADDER SURGERY  04/2004     GASTRIC BYPASS  2009     HERNIA REPAIR  11/2014    with Tummy tuck     LAPAROSCOPIC CHOLECYSTECTOMY  2004     LASIK       REVISION HARISH-EN-Y  09/01/2009     TEMPOROMANDIBULAR JOINT ARTHROPLASTY       WISDOM TOOTH EXTRACTION         Family History:    Family History   Problem Relation Age of Onset     Other Cancer Mother         [place]     Depression Mother      Other Cancer Father         Skin     Colon Cancer Maternal Grandfather      Other Cancer Paternal Grandfather         Brain cancer     Mental Illness Brother      Bipolar Disorder Brother      Mental " Illness Sister      Depression Sister      Asthma Son      Uterine Cancer Mother      Obesity Mother      Hyperlipidemia Mother      Diabetes Father      Bipolar Disorder Brother      Alcoholism Brother        Social History:  Marital Status:   [2]  Social History     Tobacco Use     Smoking status: Never Smoker     Smokeless tobacco: Never Used   Substance Use Topics     Alcohol use: Yes     Drug use: Not on file        Medications:    CALCIUM PO  Cholecalciferol (VITAMIN D PO)  cyanocobalamin (VITAMIN B12) 1000 MCG/ML injection  lamoTRIgine (LAMICTAL) 100 MG tablet  levonorgestrel (MIRENA) 20 MCG/24HR IUD  Multiple Vitamin (MULTI-VITAMIN DAILY PO)  QUEtiapine (SEROQUEL) 100 MG tablet  QUEtiapine (SEROQUEL) 50 MG tablet          Review of Systems  Constitutional:  Negative for fever or recent illness.  Cardiovascular:  Negative for chest discomfort.  Respiratory:  Negative for cough or shortness of breath.   Gastrointestinal:  Negative for abdominal pain, nausea or vomiting.   Genitourinary:  Negative for dysuria or urgency.  Musculoskeletal: Negative for back or neck pain.  Denies recent injury.  Neurological: Positive for witnessed seizure-like activity.  Negative for headache or dizziness.    All others reviewed and are negative.      Physical Exam   BP: 108/73  Pulse: 82  Temp: 97.8  F (36.6  C)  Resp: 16  Weight: 65.8 kg (145 lb)  SpO2: 100 %      Physical Exam  Constitutional:  Well developed, well nourished.  Appears nontoxic and in no acute distress.  Resting comfortably on the gurney.  HENT:  Normocephalic and atraumatic.  Symmetric in appearance.  Eyes:  Conjunctivae are normal.  Neck:  Neck supple.  Cardiovascular:  No cyanosis.  RRR.  No audible murmurs noted.    Respiratory:  Effort normal without sign of respiratory distress.  No audible wheezing or stridor.  CTAB.   Gastrointestinal:  Soft nondistended abdomen.  Nontender and without guarding.  No rigidity or rebound tenderness.  Negative  Zaman's sign.  Negative McBurney's point.    Musculoskeletal:  Moves extremities spontaneously.  Neurological:  Patient is alert.  Skin:  Skin is warm and dry.  Psychiatric:  Normal mood and affect.      ED Course        Procedures                EKG Interpretation:      Interpreted by: Gerhard Rowland  Time reviewed: Upon completion    Symptoms at time of EKG: Asymptomatic   Rhythm: Sinus  Rate: 70 bpm  Axis: Normal    Conduction: None atypical   ST Segments/ T Waves: No pathologic ST-elevations or T-wave abnormalities.  Q Waves: None  Comparison to prior: None available    Clinical Impression: No sign of ischemia or arrhythmia        Critical Care time:  none               Results for orders placed or performed during the hospital encounter of 09/05/21 (from the past 24 hour(s))   CBC with platelets differential    Narrative    The following orders were created for panel order CBC with platelets differential.  Procedure                               Abnormality         Status                     ---------                               -----------         ------                     CBC with platelets and d...[[id number]]                      Final result                 Please view results for these tests on the individual orders.   Comprehensive metabolic panel   Result Value Ref Range    Sodium 143 133 - 144 mmol/L    Potassium 3.9 3.4 - 5.3 mmol/L    Chloride 114 (H) 94 - 109 mmol/L    Carbon Dioxide (CO2) 25 20 - 32 mmol/L    Anion Gap 4 3 - 14 mmol/L    Urea Nitrogen 14 7 - 30 mg/dL    Creatinine 0.60 0.52 - 1.04 mg/dL    Calcium 8.0 (L) 8.5 - 10.1 mg/dL    Glucose 87 70 - 99 mg/dL    Alkaline Phosphatase 58 40 - 150 U/L    AST 15 0 - 45 U/L    ALT 22 0 - 50 U/L    Protein Total 6.9 6.8 - 8.8 g/dL    Albumin 3.2 (L) 3.4 - 5.0 g/dL    Bilirubin Total 0.2 0.2 - 1.3 mg/dL    GFR Estimate >90 >60 mL/min/1.73m2   HCG qualitative Blood   Result Value Ref Range    hCG Serum Qualitative Negative Negative   CBC with  platelets and differential   Result Value Ref Range    WBC Count 7.1 4.0 - 11.0 10e3/uL    RBC Count 4.12 3.80 - 5.20 10e6/uL    Hemoglobin 12.1 11.7 - 15.7 g/dL    Hematocrit 37.0 35.0 - 47.0 %    MCV 90 78 - 100 fL    MCH 29.4 26.5 - 33.0 pg    MCHC 32.7 31.5 - 36.5 g/dL    RDW 13.4 10.0 - 15.0 %    Platelet Count 344 150 - 450 10e3/uL    % Neutrophils 66 %    % Lymphocytes 24 %    % Monocytes 8 %    % Eosinophils 1 %    % Basophils 1 %    % Immature Granulocytes 0 %    NRBCs per 100 WBC 0 <1 /100    Absolute Neutrophils 4.7 1.6 - 8.3 10e3/uL    Absolute Lymphocytes 1.7 0.8 - 5.3 10e3/uL    Absolute Monocytes 0.5 0.0 - 1.3 10e3/uL    Absolute Eosinophils 0.1 0.0 - 0.7 10e3/uL    Absolute Basophils 0.0 0.0 - 0.2 10e3/uL    Absolute Immature Granulocytes 0.0 <=0.0 10e3/uL    Absolute NRBCs 0.0 10e3/uL   Head CT w/o contrast    Narrative    EXAM: CT HEAD W/O CONTRAST  LOCATION: [facility]  DATE/TIME: 9/5/2021 6:39 PM    INDICATION: Seizures  COMPARISON: None.  TECHNIQUE: Routine CT Head without IV contrast. Multiplanar reformats. Dose reduction techniques were used.    FINDINGS:  INTRACRANIAL CONTENTS: No intracranial hemorrhage, extraaxial collection, or mass effect.  No CT evidence of acute infarct. Normal parenchymal attenuation. Normal ventricles and sulci.     VISUALIZED ORBITS/SINUSES/MASTOIDS: No intraorbital abnormality. No paranasal sinus mucosal disease. No middle ear or mastoid effusion.    BONES/SOFT TISSUES: No acute abnormality.      Impression    IMPRESSION:  1.  Normal head CT.       Medications - No data to display    Assessments & Plan (with Medical Decision Making)   Kenya Zurita is a 46 year old female who presents to the department for evaluation after episode of witnessed seizure-like activity lasting only a few brief seconds but followed by what is described to be a postictal period.  In the department the patient is alert and oriented without signs of injury.   She remained comfortable throughout monitored stay without neurologic symptoms or sign of arrhythmia on cardiac monitoring.  CT imaging head/brain independently reviewed and no sign of intracranial mass or hemorrhage.  CBC and CMP values reassuring.  Patient does not routinely use alcohol, occasional marijuana use likely noncontributory.  At this time the etiology of patient's symptoms is unknown but could represent first-time seizure, recommend monitoring closely for neurologic symptoms and also refrain from driving until cleared by neurology.  The patient and partner in agreement with discharge plan including strict return instructions for developing symptoms or any other concerns.      Disclaimer:  This note consists of symbols derived from keyboarding, dictation, and/or voice recognition software.  As a result, there may be errors in the script that have gone undetected.  Please consider this when interpreting information found in the chart.        I have reviewed the nursing notes.    I have reviewed the findings, diagnosis, plan and need for follow up with the patient.       New Prescriptions    No medications on file       Final diagnoses:   Seizure-like activity (H)       9/5/2021   [facility] EMERGENCY DEPT     Gerhard Rowland DO  09/05/21 1946

## 2021-09-05 NOTE — ED TRIAGE NOTES
Witnessed seizure  That happened 45minutes ago. States never had a seizure before. Witness states her eyes closed and she went limp and he caught her. Then she started shaking for 10 seconds. Now alert and oriented. No distress noted.

## 2022-02-19 ENCOUNTER — HEALTH MAINTENANCE LETTER (OUTPATIENT)
Age: 47
End: 2022-02-19

## 2022-06-11 ENCOUNTER — HEALTH MAINTENANCE LETTER (OUTPATIENT)
Age: 47
End: 2022-06-11

## 2022-10-22 ENCOUNTER — HEALTH MAINTENANCE LETTER (OUTPATIENT)
Age: 47
End: 2022-10-22

## 2023-04-01 ENCOUNTER — HEALTH MAINTENANCE LETTER (OUTPATIENT)
Age: 48
End: 2023-04-01

## 2023-06-18 ENCOUNTER — HEALTH MAINTENANCE LETTER (OUTPATIENT)
Age: 48
End: 2023-06-18

## 2023-10-31 NOTE — TELEPHONE ENCOUNTER
"LAMOTRIGINE 100 MG TABLET        Last Written Prescription Date:  1/17/18  Last Fill Quantity: 21,   # refills: 0  Last Office Visit: 1/17/18  Future Office visit:       Requested Prescriptions   Pending Prescriptions Disp Refills     lamoTRIgine (LAMICTAL) 100 MG tablet [Pharmacy Med Name: LAMOTRIGINE 100 MG TABLET] 21 tablet 0     Sig: TAKE 1/2 TABLET DAILY FOR 2 WEEKS, THEN 1/2 TABLET TWICE DAILY    Anticonvulsants Protocol  Failed    2/7/2018  1:05 AM       Failed - Review Authorizing provider's last note.     Refer to last progress notes: confirm request is for original authorizing provider (cannot be through other providers).         Passed - No active pregnancy on record       Passed - No positive pregnancy test in last 12 months       Passed - Recent or future visit with authorizing provider    Patient had office visit in the last 6 months or has a visit in the next 30 days with authorizing provider.  See \"Patient Info\" tab in inbasket, or \"Choose Columns\" in Meds & Orders section of the refill encounter.              "
Patient just started the increase to where she is taking 1/2 tablet 2 times a day. She has not followed up with Geraldine Brand yet, but she will. She did not know that she needed to be seen this soon. She did not request the refill yet. She still has a bit left. She is wondering if Dr. Donaldson will refill this medication when it is due for a refill.  Breana Arenas RN    
Routing refill request to provider for review/approval because:  Drug not on the [facility] refill protocol     Vane Chacko RN          
This is a new medication. She needs to follow up in the office. She did not follow up with Geraldine Brand but this was the suggested next medicaiton for her. Please have her make an office visit appointment. Yen Donaldson M.D.    
31-Oct-2023 08:17

## 2024-05-08 ENCOUNTER — ANESTHESIA EVENT (OUTPATIENT)
Dept: SURGERY | Facility: CLINIC | Age: 49
End: 2024-05-08
Payer: COMMERCIAL

## 2024-05-08 NOTE — ANESTHESIA PREPROCEDURE EVALUATION
Anesthesia Pre-Procedure Evaluation    Patient: Kenya Zurita   MRN: [id number] : 1975        Procedure : Procedure(s):  bilateral temporomandibular joint arthroscopy with lysis and lavage          Past Medical History:   Diagnosis Date    Allergic rhinitis     Anemia     Anxiety     MINNA (generalized anxiety disorder)     History of alcohol abuse     Insomnia     Major depressive disorder, recurrent episode, moderate (H)     Pterygium eye     Severe episode of recurrent major depressive disorder, without psychotic features (H)     Sleep apnea     resolved with weight loss    Suicide attempt (H)     Tinea versicolor     Vulvovaginal warts       Past Surgical History:   Procedure Laterality Date    GALLBLADDER SURGERY  2004    GYN SURGERY      Colposcopy    HERNIA REPAIR  2014    with Tummy tuck    LAPAROSCOPIC CHOLECYSTECTOMY  2004    LASIK      PANNICULECTOMY      REVISION ASHLEIGH-EN-Y  2009    TEMPOROMANDIBULAR JOINT ARTHROPLASTY      WISDOM TOOTH EXTRACTION        No Known Allergies   Social History     Tobacco Use    Smoking status: Never    Smokeless tobacco: Never   Substance Use Topics    Alcohol use: Yes      Wt Readings from Last 1 Encounters:   21 65.8 kg (145 lb)        Anesthesia Evaluation   Pt has had prior anesthetic.     No history of anesthetic complications       ROS/MED HX  ENT/Pulmonary: Comment: TMJ problems   (-) sleep apnea (prior to weight loss, has resolved)   Neurologic:       Cardiovascular:     (+)  - -   -  - -                                 Previous cardiac testing   Echo: Date: Results:    Stress Test:  Date: 2024 Results:  normal  ECG Reviewed:  Date: Results:    Cath:  Date: Results:      METS/Exercise Tolerance:     Hematologic:       Musculoskeletal:       GI/Hepatic: Comment: Hx of gastric bypass   (-) GERD   Renal/Genitourinary:       Endo:       Psychiatric/Substance Use:     (+) psychiatric history (ADHD) anxiety and depression alcohol abuse  "(in remission >1yr)      Infectious Disease:       Malignancy:       Other:            Physical Exam    Airway        Mallampati: III   TM distance: > 3 FB   Neck ROM: full   Mouth opening: > 3 cm    Respiratory Devices and Support         Dental       (+) Minor Abnormalities - some fillings, tiny chips      Cardiovascular   cardiovascular exam normal          Pulmonary   pulmonary exam normal                OUTSIDE LABS:  CBC:   Lab Results   Component Value Date    WBC 7.1 09/05/2021    HGB 12.1 09/05/2021    HGB 8.4 (L) 09/19/2006    HCT 37.0 09/05/2021    HCT 24.8 (L) 09/19/2006     09/05/2021     BMP:   Lab Results   Component Value Date     09/05/2021    POTASSIUM 3.9 09/05/2021    CHLORIDE 114 (H) 09/05/2021    CO2 25 09/05/2021    BUN 14 09/05/2021    CR 0.60 09/05/2021    GLC 87 09/05/2021     COAGS: No results found for: \"PTT\", \"INR\", \"FIBR\"  POC:   Lab Results   Component Value Date    HCGS Negative 09/05/2021     HEPATIC:   Lab Results   Component Value Date    ALBUMIN 3.2 (L) 09/05/2021    PROTTOTAL 6.9 09/05/2021    ALT 22 09/05/2021    AST 15 09/05/2021    ALKPHOS 58 09/05/2021    BILITOTAL 0.2 09/05/2021     OTHER:   Lab Results   Component Value Date    LOUIE 8.0 (L) 09/05/2021       Anesthesia Plan    ASA Status:  2   NPO Status:  NPO Appropriate    Anesthesia Type: General.     - Airway: ETT   Induction: Intravenous.   Maintenance: Balanced.   Techniques and Equipment:     - Airway: Video-Laryngoscope         Consents    Anesthesia Plan(s) and associated risks, benefits, and realistic alternatives discussed. Questions answered and patient/representative(s) expressed understanding.    - Discussed:     - Discussed with:  Patient         Postoperative Care    Pain management: IV analgesics, Multi-modal analgesia.   PONV prophylaxis: Ondansetron (or other 5HT-3), Dexamethasone or Solumedrol     Comments:               Saulo Ballesteros MD    I have reviewed the pertinent notes and labs in " the chart from the past 30 days and (re)examined the patient.  Any updates or changes from those notes are reflected in this note.

## 2024-05-09 ENCOUNTER — ANESTHESIA (OUTPATIENT)
Dept: SURGERY | Facility: CLINIC | Age: 49
End: 2024-05-09
Payer: COMMERCIAL

## 2024-05-09 ENCOUNTER — HOSPITAL ENCOUNTER (OUTPATIENT)
Facility: CLINIC | Age: 49
Discharge: HOME | End: 2024-05-09
Attending: DENTIST | Admitting: DENTIST
Payer: COMMERCIAL

## 2024-05-09 VITALS
RESPIRATION RATE: 14 BRPM | BODY MASS INDEX: 24.45 KG/M2 | HEART RATE: 63 BPM | HEIGHT: 63 IN | DIASTOLIC BLOOD PRESSURE: 85 MMHG | WEIGHT: 138 LBS | SYSTOLIC BLOOD PRESSURE: 116 MMHG | TEMPERATURE: 96.6 F | OXYGEN SATURATION: 99 %

## 2024-05-09 DIAGNOSIS — M26.69 CLOSED LOCK OF TEMPOROMANDIBULAR JOINT WITHOUT REDUCTION: Primary | ICD-10-CM

## 2024-05-09 LAB — HCG UR QL: NEGATIVE

## 2024-05-09 PROCEDURE — 29800 JAW ARTHROSCOPY/SURGERY: CPT | Performed by: NURSE ANESTHETIST, CERTIFIED REGISTERED

## 2024-05-09 PROCEDURE — 999N000141 HC STATISTIC PRE-PROCEDURE NURSING ASSESSMENT: Performed by: DENTIST

## 2024-05-09 PROCEDURE — 370N000017 HC ANESTHESIA TECHNICAL FEE, PER MIN: Performed by: DENTIST

## 2024-05-09 PROCEDURE — 250N000011 HC RX IP 250 OP 636: Performed by: DENTIST

## 2024-05-09 PROCEDURE — 360N000076 HC SURGERY LEVEL 3, PER MIN: Performed by: DENTIST

## 2024-05-09 PROCEDURE — 258N000003 HC RX IP 258 OP 636: Performed by: NURSE ANESTHETIST, CERTIFIED REGISTERED

## 2024-05-09 PROCEDURE — 250N000009 HC RX 250: Performed by: NURSE ANESTHETIST, CERTIFIED REGISTERED

## 2024-05-09 PROCEDURE — 81025 URINE PREGNANCY TEST: CPT | Performed by: ANESTHESIOLOGY

## 2024-05-09 PROCEDURE — 710N000009 HC RECOVERY PHASE 1, LEVEL 1, PER MIN: Performed by: DENTIST

## 2024-05-09 PROCEDURE — 250N000011 HC RX IP 250 OP 636: Performed by: NURSE ANESTHETIST, CERTIFIED REGISTERED

## 2024-05-09 PROCEDURE — 258N000003 HC RX IP 258 OP 636: Performed by: ANESTHESIOLOGY

## 2024-05-09 PROCEDURE — 250N000013 HC RX MED GY IP 250 OP 250 PS 637: Performed by: DENTIST

## 2024-05-09 PROCEDURE — 710N000012 HC RECOVERY PHASE 2, PER MINUTE: Performed by: DENTIST

## 2024-05-09 PROCEDURE — 250N000011 HC RX IP 250 OP 636: Performed by: ANESTHESIOLOGY

## 2024-05-09 PROCEDURE — 272N000001 HC OR GENERAL SUPPLY STERILE: Performed by: DENTIST

## 2024-05-09 PROCEDURE — 250N000025 HC SEVOFLURANE, PER MIN: Performed by: DENTIST

## 2024-05-09 PROCEDURE — 29800 JAW ARTHROSCOPY/SURGERY: CPT | Performed by: ANESTHESIOLOGY

## 2024-05-09 RX ORDER — CEFAZOLIN SODIUM/WATER 2 G/20 ML
2 SYRINGE (ML) INTRAVENOUS
Status: COMPLETED | OUTPATIENT
Start: 2024-05-09 | End: 2024-05-09

## 2024-05-09 RX ORDER — ONDANSETRON 2 MG/ML
INJECTION INTRAMUSCULAR; INTRAVENOUS PRN
Status: DISCONTINUED | OUTPATIENT
Start: 2024-05-09 | End: 2024-05-09

## 2024-05-09 RX ORDER — NALOXONE HYDROCHLORIDE 0.4 MG/ML
0.1 INJECTION, SOLUTION INTRAMUSCULAR; INTRAVENOUS; SUBCUTANEOUS
Status: DISCONTINUED | OUTPATIENT
Start: 2024-05-09 | End: 2024-05-09 | Stop reason: HOSPADM

## 2024-05-09 RX ORDER — ONDANSETRON 4 MG/1
4 TABLET, ORALLY DISINTEGRATING ORAL EVERY 30 MIN PRN
Status: DISCONTINUED | OUTPATIENT
Start: 2024-05-09 | End: 2024-05-09 | Stop reason: HOSPADM

## 2024-05-09 RX ORDER — FENTANYL CITRATE 0.05 MG/ML
25 INJECTION, SOLUTION INTRAMUSCULAR; INTRAVENOUS EVERY 5 MIN PRN
Status: DISCONTINUED | OUTPATIENT
Start: 2024-05-09 | End: 2024-05-09 | Stop reason: HOSPADM

## 2024-05-09 RX ORDER — CEPHALEXIN 500 MG/1
500 CAPSULE ORAL 3 TIMES DAILY
Qty: 21 CAPSULE | Refills: 0 | Status: SHIPPED | OUTPATIENT
Start: 2024-05-09 | End: 2024-05-16

## 2024-05-09 RX ORDER — SODIUM CHLORIDE, SODIUM LACTATE, POTASSIUM CHLORIDE, CALCIUM CHLORIDE 600; 310; 30; 20 MG/100ML; MG/100ML; MG/100ML; MG/100ML
INJECTION, SOLUTION INTRAVENOUS CONTINUOUS
Status: DISCONTINUED | OUTPATIENT
Start: 2024-05-09 | End: 2024-05-09 | Stop reason: HOSPADM

## 2024-05-09 RX ORDER — HYDROMORPHONE HCL IN WATER/PF 6 MG/30 ML
0.2 PATIENT CONTROLLED ANALGESIA SYRINGE INTRAVENOUS EVERY 5 MIN PRN
Status: DISCONTINUED | OUTPATIENT
Start: 2024-05-09 | End: 2024-05-09 | Stop reason: HOSPADM

## 2024-05-09 RX ORDER — LIDOCAINE HYDROCHLORIDE 20 MG/ML
SOLUTION OROPHARYNGEAL PRN
Status: DISCONTINUED | OUTPATIENT
Start: 2024-05-09 | End: 2024-05-09

## 2024-05-09 RX ORDER — FENTANYL CITRATE 0.05 MG/ML
50 INJECTION, SOLUTION INTRAMUSCULAR; INTRAVENOUS EVERY 5 MIN PRN
Status: DISCONTINUED | OUTPATIENT
Start: 2024-05-09 | End: 2024-05-09 | Stop reason: HOSPADM

## 2024-05-09 RX ORDER — BUPIVACAINE HYDROCHLORIDE 2.5 MG/ML
INJECTION, SOLUTION EPIDURAL; INFILTRATION; INTRACAUDAL
Status: DISCONTINUED
Start: 2024-05-09 | End: 2024-05-09 | Stop reason: HOSPADM

## 2024-05-09 RX ORDER — HYDROMORPHONE HCL IN WATER/PF 6 MG/30 ML
0.4 PATIENT CONTROLLED ANALGESIA SYRINGE INTRAVENOUS EVERY 5 MIN PRN
Status: DISCONTINUED | OUTPATIENT
Start: 2024-05-09 | End: 2024-05-09 | Stop reason: HOSPADM

## 2024-05-09 RX ORDER — ACETAMINOPHEN 325 MG/1
325-650 TABLET ORAL EVERY 6 HOURS PRN
COMMUNITY
End: 2024-09-24

## 2024-05-09 RX ORDER — PROPOFOL 10 MG/ML
INJECTION, EMULSION INTRAVENOUS PRN
Status: DISCONTINUED | OUTPATIENT
Start: 2024-05-09 | End: 2024-05-09

## 2024-05-09 RX ORDER — DEXTROAMPHETAMINE SACCHARATE, AMPHETAMINE ASPARTATE, DEXTROAMPHETAMINE SULFATE AND AMPHETAMINE SULFATE 1.25; 1.25; 1.25; 1.25 MG/1; MG/1; MG/1; MG/1
5 TABLET ORAL 2 TIMES DAILY
COMMUNITY
End: 2024-09-24

## 2024-05-09 RX ORDER — HEPARIN SODIUM 1000 [USP'U]/ML
INJECTION, SOLUTION INTRAVENOUS; SUBCUTANEOUS PRN
Status: DISCONTINUED | OUTPATIENT
Start: 2024-05-09 | End: 2024-05-09 | Stop reason: HOSPADM

## 2024-05-09 RX ORDER — PROPRANOLOL HYDROCHLORIDE 10 MG/1
10 TABLET ORAL 3 TIMES DAILY
COMMUNITY

## 2024-05-09 RX ORDER — CHLORHEXIDINE GLUCONATE ORAL RINSE 1.2 MG/ML
10 SOLUTION DENTAL ONCE
Status: COMPLETED | OUTPATIENT
Start: 2024-05-09 | End: 2024-05-09

## 2024-05-09 RX ORDER — DEXAMETHASONE SODIUM PHOSPHATE 4 MG/ML
INJECTION, SOLUTION INTRA-ARTICULAR; INTRALESIONAL; INTRAMUSCULAR; INTRAVENOUS; SOFT TISSUE
Status: DISCONTINUED
Start: 2024-05-09 | End: 2024-05-09 | Stop reason: HOSPADM

## 2024-05-09 RX ORDER — ONDANSETRON 2 MG/ML
4 INJECTION INTRAMUSCULAR; INTRAVENOUS EVERY 30 MIN PRN
Status: DISCONTINUED | OUTPATIENT
Start: 2024-05-09 | End: 2024-05-09 | Stop reason: HOSPADM

## 2024-05-09 RX ORDER — HYDROCODONE BITARTRATE AND ACETAMINOPHEN 5; 325 MG/1; MG/1
1 TABLET ORAL EVERY 6 HOURS PRN
Qty: 15 TABLET | Refills: 0 | Status: SHIPPED | OUTPATIENT
Start: 2024-05-09 | End: 2024-05-13

## 2024-05-09 RX ORDER — CEFADROXIL 500 MG/1
500 CAPSULE ORAL 2 TIMES DAILY
COMMUNITY
End: 2024-09-24

## 2024-05-09 RX ORDER — LIDOCAINE HYDROCHLORIDE 20 MG/ML
INJECTION, SOLUTION INFILTRATION; PERINEURAL PRN
Status: DISCONTINUED | OUTPATIENT
Start: 2024-05-09 | End: 2024-05-09

## 2024-05-09 RX ORDER — SODIUM CHLORIDE, SODIUM LACTATE, POTASSIUM CHLORIDE, CALCIUM CHLORIDE 600; 310; 30; 20 MG/100ML; MG/100ML; MG/100ML; MG/100ML
INJECTION, SOLUTION INTRAVENOUS CONTINUOUS PRN
Status: DISCONTINUED | OUTPATIENT
Start: 2024-05-09 | End: 2024-05-09

## 2024-05-09 RX ORDER — DEXAMETHASONE SODIUM PHOSPHATE 4 MG/ML
INJECTION, SOLUTION INTRA-ARTICULAR; INTRALESIONAL; INTRAMUSCULAR; INTRAVENOUS; SOFT TISSUE PRN
Status: DISCONTINUED | OUTPATIENT
Start: 2024-05-09 | End: 2024-05-09

## 2024-05-09 RX ORDER — HEPARIN SODIUM 1000 [USP'U]/ML
INJECTION, SOLUTION INTRAVENOUS; SUBCUTANEOUS
Status: DISCONTINUED
Start: 2024-05-09 | End: 2024-05-09 | Stop reason: HOSPADM

## 2024-05-09 RX ORDER — FENTANYL CITRATE 50 UG/ML
INJECTION, SOLUTION INTRAMUSCULAR; INTRAVENOUS PRN
Status: DISCONTINUED | OUTPATIENT
Start: 2024-05-09 | End: 2024-05-09

## 2024-05-09 RX ORDER — DEXAMETHASONE SODIUM PHOSPHATE 4 MG/ML
4 INJECTION, SOLUTION INTRA-ARTICULAR; INTRALESIONAL; INTRAMUSCULAR; INTRAVENOUS; SOFT TISSUE
Status: DISCONTINUED | OUTPATIENT
Start: 2024-05-09 | End: 2024-05-09 | Stop reason: HOSPADM

## 2024-05-09 RX ORDER — DEXAMETHASONE SODIUM PHOSPHATE 4 MG/ML
INJECTION, SOLUTION INTRA-ARTICULAR; INTRALESIONAL; INTRAMUSCULAR; INTRAVENOUS; SOFT TISSUE PRN
Status: DISCONTINUED | OUTPATIENT
Start: 2024-05-09 | End: 2024-05-09 | Stop reason: HOSPADM

## 2024-05-09 RX ORDER — OXYMETAZOLINE HYDROCHLORIDE 0.05 G/100ML
SPRAY NASAL PRN
Status: DISCONTINUED | OUTPATIENT
Start: 2024-05-09 | End: 2024-05-09

## 2024-05-09 RX ORDER — CEFAZOLIN SODIUM/WATER 2 G/20 ML
2 SYRINGE (ML) INTRAVENOUS SEE ADMIN INSTRUCTIONS
Status: DISCONTINUED | OUTPATIENT
Start: 2024-05-09 | End: 2024-05-09 | Stop reason: HOSPADM

## 2024-05-09 RX ORDER — HYDROCODONE BITARTRATE AND ACETAMINOPHEN 5; 325 MG/1; MG/1
1 TABLET ORAL ONCE
Status: COMPLETED | OUTPATIENT
Start: 2024-05-09 | End: 2024-05-09

## 2024-05-09 RX ORDER — PROPOFOL 10 MG/ML
INJECTION, EMULSION INTRAVENOUS CONTINUOUS PRN
Status: DISCONTINUED | OUTPATIENT
Start: 2024-05-09 | End: 2024-05-09

## 2024-05-09 RX ADMIN — PROPOFOL 40 MG: 10 INJECTION, EMULSION INTRAVENOUS at 08:25

## 2024-05-09 RX ADMIN — FENTANYL CITRATE 50 MCG: 50 INJECTION INTRAMUSCULAR; INTRAVENOUS at 08:25

## 2024-05-09 RX ADMIN — ROCURONIUM BROMIDE 50 MG: 50 INJECTION, SOLUTION INTRAVENOUS at 07:41

## 2024-05-09 RX ADMIN — Medication 2 G: at 07:37

## 2024-05-09 RX ADMIN — SODIUM CHLORIDE, POTASSIUM CHLORIDE, SODIUM LACTATE AND CALCIUM CHLORIDE: 600; 310; 30; 20 INJECTION, SOLUTION INTRAVENOUS at 07:37

## 2024-05-09 RX ADMIN — FENTANYL CITRATE 50 MCG: 50 INJECTION INTRAMUSCULAR; INTRAVENOUS at 08:31

## 2024-05-09 RX ADMIN — FENTANYL CITRATE 50 MCG: 50 INJECTION, SOLUTION INTRAMUSCULAR; INTRAVENOUS at 09:19

## 2024-05-09 RX ADMIN — MIDAZOLAM 2 MG: 1 INJECTION INTRAMUSCULAR; INTRAVENOUS at 07:38

## 2024-05-09 RX ADMIN — FENTANYL CITRATE 50 MCG: 50 INJECTION INTRAMUSCULAR; INTRAVENOUS at 08:23

## 2024-05-09 RX ADMIN — FENTANYL CITRATE 50 MCG: 50 INJECTION INTRAMUSCULAR; INTRAVENOUS at 07:41

## 2024-05-09 RX ADMIN — PROPOFOL 35 MCG/KG/MIN: 10 INJECTION, EMULSION INTRAVENOUS at 07:50

## 2024-05-09 RX ADMIN — HYDROCODONE BITARTRATE AND ACETAMINOPHEN 1 TABLET: 5; 325 TABLET ORAL at 09:44

## 2024-05-09 RX ADMIN — DEXAMETHASONE SODIUM PHOSPHATE 4 MG: 4 INJECTION, SOLUTION INTRA-ARTICULAR; INTRALESIONAL; INTRAMUSCULAR; INTRAVENOUS; SOFT TISSUE at 07:50

## 2024-05-09 RX ADMIN — LIDOCAINE HYDROCHLORIDE 15 ML: 20 SOLUTION ORAL; TOPICAL at 07:41

## 2024-05-09 RX ADMIN — ONDANSETRON 4 MG: 2 INJECTION INTRAMUSCULAR; INTRAVENOUS at 08:37

## 2024-05-09 RX ADMIN — CHLORHEXIDINE GLUCONATE 10 ML: 1.2 RINSE ORAL at 06:35

## 2024-05-09 RX ADMIN — LIDOCAINE HYDROCHLORIDE 60 MG: 20 INJECTION, SOLUTION INFILTRATION; PERINEURAL at 07:41

## 2024-05-09 RX ADMIN — SODIUM CHLORIDE, POTASSIUM CHLORIDE, SODIUM LACTATE AND CALCIUM CHLORIDE: 600; 310; 30; 20 INJECTION, SOLUTION INTRAVENOUS at 09:27

## 2024-05-09 RX ADMIN — FENTANYL CITRATE 50 MCG: 50 INJECTION, SOLUTION INTRAMUSCULAR; INTRAVENOUS at 09:26

## 2024-05-09 RX ADMIN — SUGAMMADEX 200 MG: 100 INJECTION, SOLUTION INTRAVENOUS at 08:43

## 2024-05-09 RX ADMIN — PROPOFOL 300 MG: 10 INJECTION, EMULSION INTRAVENOUS at 07:41

## 2024-05-09 RX ADMIN — OXYMETAZOLINE HYDROCHLORIDE 3 SPRAY: 0.05 SPRAY NASAL at 07:37

## 2024-05-09 ASSESSMENT — ACTIVITIES OF DAILY LIVING (ADL)
ADLS_ACUITY_SCORE: 35

## 2024-05-09 NOTE — ANESTHESIA CARE TRANSFER NOTE
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    Anesthesia Type:   General     Note:    Oropharynx: oropharynx clear of all foreign objects and spontaneously breathing  Level of Consciousness: awake  Oxygen Supplementation: face mask  Level of Supplemental Oxygen (L/min / FiO2): 6  Independent Airway: airway patency satisfactory and stable  Dentition: dentition unchanged  Vital Signs Stable: post-procedure vital signs reviewed and stable  Report to RN Given: handoff report given  Patient transferred to: PACU  Comments: Neuromuscular blockade reversed with sugammadex, spontaneous respirations, adequate tidal volumes, followed commands to voice, oropharynx suctioned with soft flexible catheter, extubated atraumatically, extubated with suction, airway patent after extubation.  Oxygen via facemask at 6 liters per minute to PACU. Oxygen tubing connected to wall O2 in PACU, SpO2, NiBP, and EKG monitors and alarms on and functioning, Raymond Hugger warmer connected to patient gown, report on patient's clinical status given to PACU RN, RN questions answered.         Handoff Report: Identifed the Patient, Identified the Reponsible Provider, Reviewed the pertinent medical history, Discussed the surgical course, Reviewed Intra-OP anesthesia mangement and issues during anesthesia, Set expectations for post-procedure period and Allowed opportunity for questions and acknowledgement of understanding      Vitals:  Vitals Value Taken Time   /91 05/09/24 0855   Temp     Pulse 67 05/09/24 0857   Resp 12 05/09/24 0857   SpO2 100 % 05/09/24 0857   Vitals shown include unfiled device  data.    Electronically Signed By: CAR Cummings CRNA  May 9, 2024  8:58 AM

## 2024-05-09 NOTE — OP NOTE
Oral & Maxillofacial Surgery Operative Report    Date:  2024    Patient Information:  Patient Name: Kenya Zurita  Age: 49 year old  Sex: female  MRN: [id number]   : 1975    Pre-Op Diagnosis(es):  1.  Bilateral TMJ Arthralgia, Capsulitis  2.  Bilateral TMJ Closed Lock  3.  Bilateral TMJ Degenerative Joint Disease    Post-Op Diagnosis(es):  SAME    Procedure(s):  1.  Bilateral TMJ Arthroscopy with Lysis and Lavage    Planned Anesthesia:  General via Nasal LEBRON  Local Anesthesia with B TMJ Intracapsular infection 0.25% marcaine    Surgeon(s):  1) Kj Muse DDS    Brief History and Indication for Operation:  Kenya Zurita is 49 year old female who present to [facility] for bilateral temporomandibular joint arthroscopy with lysis and lavage.  Kenya's primary issue is limited range of motion.  She has a longstanding history of a right-sided temporomandibular joint arthroscopy at the [facility] when she was 20 years of age.  She has had on and off TMD related issues for most of her life.  She has had recent increase of discomfort and difficulty with opening.  Preoperative examination she does have bilateral TMJ closed as well as degenerative joint findings on imaging studies.   We have elected to proceed with another minimally invasive approach to the bilateral TMJ as she had fairly good success previously.  Of course, in the midst of degeneration arthroscopy is not always as effective, but at the same time if we can improve range of motion by about 8 to 15 mm it certainly would improve her quality of life.  Therefore, we have decided to proceed with arthroscopy.  Patient referral and consultation notations have been reviewed.    A pre-operative history and physical has been completed and reviewed by myself as well as the anesthesia team.  The treatment plan was re-reviewed with the patient pre-operatively.  Risks and potential complications have been reviewed.  These  include, but are not limited to, pain, bleeding, bruising, infection, inflammation, sinus injury, nerve injury (temporary & permanent) in the areas of operation, including the facial nerve.  Other rare injuries such as parotid injury, ear canal perforation, middle cranial fossa perforation, and instrument breakage were also reviewed.  The potential of damage to adjacent teeth and soft tissues structures have been reviewed.   Wound healing related complications have been reviewed as well as how to avoid them.    Anesthesia related complications have been reviewed by the Anesthesia team.  An opportunity to ask questions was also given.  Ultimately, the patient agreed to the plan and has signed the consent.  I have also signed the operative permit. The site marking sheet has been completed.    Description of Procedure:  The patient was taken the operative suite and seated supine.   Induction began by the anesthesia team via IV access.   Following adequate depth, a Nasal LEBRON was inserted on the 1st attempt.   End tidal CO2 and bilateral breath sounds were confirmed.   The eyes were protected.  The tube was secured by the OMFS and Anesthesia teams.   Both parties were satisfied.  The operative table was positioned. Lines/tubes were secured and positioned.  The patient as padded and protected.  Local anesthesia was given as describe above the anesthesia section of this report.  Sterile preparation/draping was then completed for sterile bilateral temporomandibular joint surgery.  The surgical table was organized.  The arthroscopy equipment was connected and verified functional.  A timeout per [facility] was completed.  A throat pack was then placed.       Attention was given to the right temporomandibular joint first.  The joint was marked in the standard fashion 10 mm anterior and 2 mm inferior to the alar tragal line from the pretragal crease.  A 25-gauge needle with 0.25% Marcaine was then introduced  directly into the right temporomandibular joint capsule.  3 cc was injected for insufflation.  The needle was withdrawn.  The sharp trocar/cannula complex was then inserted at the same angle and directly into the joint.  Entry was straightforward and without complication and on the first attempt.  The sharp trocar was withdrawn.  The 30 degree arthroscope was then inserted through the cannula.  I noted quite a bit of cloudiness and blurriness in the image but could verify the joint.  Photos taken.  Numerous vascular markings were identified.  Minor fibrillations appreciated.  The joint was then lavaged with 200 cc of heparinized lactated Ringer's via push pull technique I then inserted 4 mg of dexamethasone and 50 mcg of fentanyl intracapsular.  The cannula complex was then withdrawn and heavy pressure held for 5 minutes.  A Band-Aid was applied.    Attention was now given to the left temporomandibular joint the surgical sequence was exactly the same.  The joint was marked in the standard fashion 10 mm anterior and 2 mm inferior to the alar tragal line from the pretragal crease.  A 25-gauge needle with 0.25% Marcaine was then introduced directly into the right temporomandibular joint capsule.  3 cc was injected for insufflation.  The needle was withdrawn.  The sharp trocar/cannula complex was then inserted at the same angle and directly into the joint.  Entry was straightforward and without complication and on the first attempt.  The sharp trocar was withdrawn.  The 30 degree arthroscope was then inserted through the cannula.  I noted quite a bit of cloudiness and blurriness in the image but could verify the joint.  Photos taken. Numerous vascular markings were identified.  Minor fibrillations appreciated.  The joint was then lavaged with 200 cc of heparinized lactated Ringer's via push pull technique I then inserted 4 mg of dexamethasone and 50 mcg of fentanyl intracapsular.  The cannula complex was then withdrawn  and heavy pressure held for 5 minutes.  A Band-Aid was applied.    This completed the operation.   The oral cavity was then suctioned.  The throat pack was removed.   Surgical needle and sponge counts were noted to be correct x2.  The patient was given back to the Anesthesia team for wakeup and recovery which was uneventful.    Operative Time:  60min    Estimated Blood Loss:  2mL    Complications  None apparent      Drains:  None    Specimens:  None       Disposition:  Patient will be discharged as a same day surgery patient.  Follow-up will be scheduled through our office.  Post-op orders have been written.  Post-OpMeds:  Norco, Keflex  Ok to wash face today with soapy water and washcloth.  Leave bandaids on today  Ok to shower tomorrow.  PT is scheduled tomorrow  F/U with Dr. Muse in 2 weeks    Signature:  Kj Muse DDS    [facility]  [address], [address]  [place] [address]  Clinic/On-call Phone: [phone]  Clinic Fax: [phone]

## 2024-05-09 NOTE — BRIEF OP NOTE
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  Complications: None.  Implants: None

## 2024-05-09 NOTE — ANESTHESIA PROCEDURE NOTES
Airway       Patient location during procedure: OR       Procedure Start/Stop Times: 5/9/2024 7:47 AM  Staff -        Anesthesiologist:  Saulo Ballesteros MD       Resident/Fellow: Marianna Ford APRN CRNA       Other Anesthesia Staff: Demi Gregory RN       Performed By: SRNA  Consent for Airway        Urgency: elective  Indications and Patient Condition       Indications for airway management: nimco-procedural       Induction type:intravenous       Mask difficulty assessment: 2 - vent by mask + OA or adjuvant +/- NMBA    Final Airway Details       Final airway type: endotracheal airway       Successful airway: Nasal and LEBRON  Endotracheal Airway Details        ETT size (mm): 7.0       Cuffed: yes       Successful intubation technique: video laryngoscopy       VL Blade Size: Loya 3       Grade View of Cords: 1       Adjucts: stylet       Position: Left       Measured from: nares       Bite block used: None    Post intubation assessment        Placement verified by: capnometry and chest rise        Number of attempts at approach: 1       Number of other approaches attempted: 0       Secured with: tape       Ease of procedure: easy       Dentition: Intact and Unchanged    Medication(s) Administered   Medication Administration Time: 5/9/2024 7:47 AM

## 2024-05-09 NOTE — ANESTHESIA POSTPROCEDURE EVALUATION
Patient: Kenya Zurita    Procedure: Procedure(s):  bilateral temporomandibular joint arthroscopy with lysis and lavage       Anesthesia Type:  General    Note:     Postop Pain Control: Uneventful            Sign Out: Well controlled pain   PONV: No   Neuro/Psych: Uneventful            Sign Out: Acceptable/Baseline neuro status   Airway/Respiratory: Uneventful            Sign Out: Acceptable/Baseline resp. status   CV/Hemodynamics: Uneventful            Sign Out: Acceptable CV status; No obvious hypovolemia; No obvious fluid overload   Other NRE: NONE   DID A NON-ROUTINE EVENT OCCUR? No           Last vitals:  Vitals Value Taken Time   /70 05/09/24 0945   Temp 36.2  C (97.2  F) 05/09/24 0855   Pulse 55 05/09/24 0948   Resp 10 05/09/24 0948   SpO2 98 % 05/09/24 0948   Vitals shown include unfiled device data.    Electronically Signed By: Saulo Ballesteros MD  May 9, 2024  1:16 PM

## 2024-06-02 ENCOUNTER — HEALTH MAINTENANCE LETTER (OUTPATIENT)
Age: 49
End: 2024-06-02

## 2024-08-07 ENCOUNTER — APPOINTMENT (OUTPATIENT)
Dept: ULTRASOUND IMAGING | Facility: CLINIC | Age: 49
End: 2024-08-07
Attending: EMERGENCY MEDICINE
Payer: COMMERCIAL

## 2024-08-07 ENCOUNTER — HOSPITAL ENCOUNTER (EMERGENCY)
Facility: CLINIC | Age: 49
Discharge: HOME | End: 2024-08-07
Attending: EMERGENCY MEDICINE | Admitting: EMERGENCY MEDICINE
Payer: COMMERCIAL

## 2024-08-07 VITALS
HEIGHT: 63 IN | TEMPERATURE: 97.6 F | BODY MASS INDEX: 23.52 KG/M2 | WEIGHT: 132.72 LBS | SYSTOLIC BLOOD PRESSURE: 109 MMHG | DIASTOLIC BLOOD PRESSURE: 75 MMHG | OXYGEN SATURATION: 99 % | HEART RATE: 69 BPM | RESPIRATION RATE: 16 BRPM

## 2024-08-07 DIAGNOSIS — Z71.1: Primary | ICD-10-CM

## 2024-08-07 LAB
ABO/RH(D): NORMAL
ALBUMIN UR-MCNC: NEGATIVE MG/DL
ANION GAP SERPL CALCULATED.3IONS-SCNC: 12 MMOL/L (ref 7–15)
ANTIBODY SCREEN: NEGATIVE
APPEARANCE UR: CLEAR
BASOPHILS # BLD AUTO: 0 10E3/UL (ref 0–0.2)
BASOPHILS NFR BLD AUTO: 0 %
BILIRUB UR QL STRIP: NEGATIVE
BUN SERPL-MCNC: 11.6 MG/DL (ref 6–20)
CALCIUM SERPL-MCNC: 8.8 MG/DL (ref 8.8–10.4)
CHLORIDE SERPL-SCNC: 104 MMOL/L (ref 98–107)
COLOR UR AUTO: NORMAL
CREAT SERPL-MCNC: 0.55 MG/DL (ref 0.51–0.95)
EGFRCR SERPLBLD CKD-EPI 2021: >90 ML/MIN/1.73M2
EOSINOPHIL # BLD AUTO: 0 10E3/UL (ref 0–0.7)
EOSINOPHIL NFR BLD AUTO: 1 %
ERYTHROCYTE [DISTWIDTH] IN BLOOD BY AUTOMATED COUNT: 12.8 % (ref 10–15)
GLUCOSE SERPL-MCNC: 84 MG/DL (ref 70–99)
GLUCOSE UR STRIP-MCNC: NEGATIVE MG/DL
HCG INTACT+B SERPL-ACNC: ABNORMAL MIU/ML
HCO3 SERPL-SCNC: 21 MMOL/L (ref 22–29)
HCT VFR BLD AUTO: 41.2 % (ref 35–47)
HGB BLD-MCNC: 13.3 G/DL (ref 11.7–15.7)
HGB UR QL STRIP: NEGATIVE
HOLD SPECIMEN: NORMAL
HOLD SPECIMEN: NORMAL
IMM GRANULOCYTES # BLD: 0 10E3/UL
IMM GRANULOCYTES NFR BLD: 0 %
KETONES UR STRIP-MCNC: NEGATIVE MG/DL
LEUKOCYTE ESTERASE UR QL STRIP: NEGATIVE
LYMPHOCYTES # BLD AUTO: 2.2 10E3/UL (ref 0.8–5.3)
LYMPHOCYTES NFR BLD AUTO: 31 %
MCH RBC QN AUTO: 29.2 PG (ref 26.5–33)
MCHC RBC AUTO-ENTMCNC: 32.3 G/DL (ref 31.5–36.5)
MCV RBC AUTO: 91 FL (ref 78–100)
MONOCYTES # BLD AUTO: 0.6 10E3/UL (ref 0–1.3)
MONOCYTES NFR BLD AUTO: 8 %
NEUTROPHILS # BLD AUTO: 4.3 10E3/UL (ref 1.6–8.3)
NEUTROPHILS NFR BLD AUTO: 60 %
NITRATE UR QL: NEGATIVE
NRBC # BLD AUTO: 0 10E3/UL
NRBC BLD AUTO-RTO: 0 /100
PH UR STRIP: 6.5 [PH] (ref 5–7)
PLATELET # BLD AUTO: 339 10E3/UL (ref 150–450)
POTASSIUM SERPL-SCNC: 4 MMOL/L (ref 3.4–5.3)
RBC # BLD AUTO: 4.55 10E6/UL (ref 3.8–5.2)
RBC URINE: 0 /HPF
SODIUM SERPL-SCNC: 137 MMOL/L (ref 135–145)
SP GR UR STRIP: 1.01 (ref 1–1.03)
SPECIMEN EXPIRATION DATE: NORMAL
SQUAMOUS EPITHELIAL: <1 /HPF
UROBILINOGEN UR STRIP-MCNC: NORMAL MG/DL
WBC # BLD AUTO: 7.1 10E3/UL (ref 4–11)
WBC URINE: 0 /HPF

## 2024-08-07 PROCEDURE — 81001 URINALYSIS AUTO W/SCOPE: CPT | Performed by: EMERGENCY MEDICINE

## 2024-08-07 PROCEDURE — 85025 COMPLETE CBC W/AUTO DIFF WBC: CPT | Performed by: EMERGENCY MEDICINE

## 2024-08-07 PROCEDURE — 76801 OB US < 14 WKS SINGLE FETUS: CPT

## 2024-08-07 PROCEDURE — 84702 CHORIONIC GONADOTROPIN TEST: CPT | Performed by: EMERGENCY MEDICINE

## 2024-08-07 PROCEDURE — 36415 COLL VENOUS BLD VENIPUNCTURE: CPT | Performed by: EMERGENCY MEDICINE

## 2024-08-07 PROCEDURE — 99284 EMERGENCY DEPT VISIT MOD MDM: CPT | Mod: 25

## 2024-08-07 PROCEDURE — 80048 BASIC METABOLIC PNL TOTAL CA: CPT | Performed by: EMERGENCY MEDICINE

## 2024-08-07 ASSESSMENT — ACTIVITIES OF DAILY LIVING (ADL)
ADLS_ACUITY_SCORE: 35
ADLS_ACUITY_SCORE: 33

## 2024-08-07 ASSESSMENT — COLUMBIA-SUICIDE SEVERITY RATING SCALE - C-SSRS
2. HAVE YOU ACTUALLY HAD ANY THOUGHTS OF KILLING YOURSELF IN THE PAST MONTH?: NO
6. HAVE YOU EVER DONE ANYTHING, STARTED TO DO ANYTHING, OR PREPARED TO DO ANYTHING TO END YOUR LIFE?: NO
1. IN THE PAST MONTH, HAVE YOU WISHED YOU WERE DEAD OR WISHED YOU COULD GO TO SLEEP AND NOT WAKE UP?: NO

## 2024-08-07 NOTE — ED PROVIDER NOTES
"  Emergency Department Note      History of Present Illness     Chief Complaint   Pregnancy Complications and Abdominal Pain    HPI   Kenya Zurita is a 49 year old female presenting with pregnancy complications and left sided abdominal pain. Kerry had a positive pregnancy test yesterday. She went to the pregnancy clinic today and underwent an US. They were able to visualize the sac, with nothing inside it. She was referred to the ED for further evaluation. She endorses nausea, vomiting, constipation, and bloating. The patient denies vaginal bleeding, vaginal discharge, hematuria, dysuria, shortness of breath, or chest pain. She reports a history of abdominal surgery. No recent medication changes.     Independent Historian   None    Review of External Notes   I reviewed the clinic note from 7/11/24.    Past Medical History     Medical History and Problem List   Allergic rhinitis  Anemia  Anxiety  Arthritis   Chololithiasis   History of alcohol abuse  Insomnia  Major depressive disorder  Pterygium eye  Sleep apnea  Suicide attempt   Tinea versicolor  Vulvovaginal warts    Medications   Adderall  Lamotrigine   Methylphenidate   Methocarbamol   Propranolol   Quetiapine     Surgical History   TMJ arthroscopy   Cholecystectomy   Colposcopy   Hernia repair   Lasik   Panniculectomy   [place] teeth extraction   Breast augmentation   Gastric bypass     Physical Exam     Patient Vitals for the past 24 hrs:   BP Temp Temp src Pulse Resp SpO2 Height Weight   08/07/24 1206 109/75 -- -- 69 16 99 % -- --   08/07/24 1026 119/77 97.6  F (36.4  C) Temporal 59 16 99 % 1.6 m (5' 3\") 60.2 kg (132 lb 11.5 oz)     Physical Exam  General: No acute distress  Head: No obvious trauma to head.  Ears, Nose, Throat:  External ears normal.  Nose normal.  No pharyngeal erythema, swelling or exudate.  Midline uvula. Moist mucus membranes.  Eyes:  Conjunctivae clear.   Neck: Normal range of motion.  Neck supple.   CV: Regular rate and rhythm.  No " murmurs.      Respiratory: Effort normal and breath sounds normal.  No wheezing or crackles.   Gastrointestinal: Soft.  No distension. There is no rigidity, no rebound and no guarding. Tenderness to palpation in the left upper and left lower.   Musculoskeletal: Normal range of motion.  Non tender extremities to palpations. No lower extremity edema  Neuro: Alert. Moving all extremities appropriately.  Normal speech.    Skin: Skin is warm and dry.  No rash noted.   Psych: Normal mood and affect. Behavior is normal.     Diagnostics     Lab Results   Labs Ordered and Resulted from Time of ED Arrival to Time of ED Departure   HCG QUANTITATIVE PREGNANCY - Abnormal       Result Value    hCG Quantitative 32,301 (*)    BASIC METABOLIC PANEL - Abnormal    Sodium 137      Potassium 4.0      Chloride 104      Carbon Dioxide (CO2) 21 (*)     Anion Gap 12      Urea Nitrogen 11.6      Creatinine 0.55      GFR Estimate >90      Calcium 8.8      Glucose 84     ROUTINE UA WITH MICROSCOPIC REFLEX TO CULTURE - Normal    Color Urine Straw      Appearance Urine Clear      Glucose Urine Negative      Bilirubin Urine Negative      Ketones Urine Negative      Specific Gravity Urine 1.008      Blood Urine Negative      pH Urine 6.5      Protein Albumin Urine Negative      Urobilinogen Urine Normal      Nitrite Urine Negative      Leukocyte Esterase Urine Negative      RBC Urine 0      WBC Urine 0      Squamous Epithelials Urine <1     CBC WITH PLATELETS AND DIFFERENTIAL    WBC Count 7.1      RBC Count 4.55      Hemoglobin 13.3      Hematocrit 41.2      MCV 91      MCH 29.2      MCHC 32.3      RDW 12.8      Platelet Count 339      % Neutrophils 60      % Lymphocytes 31      % Monocytes 8      % Eosinophils 1      % Basophils 0      % Immature Granulocytes 0      NRBCs per 100 WBC 0      Absolute Neutrophils 4.3      Absolute Lymphocytes 2.2      Absolute Monocytes 0.6      Absolute Eosinophils 0.0      Absolute Basophils 0.0      Absolute  Immature Granulocytes 0.0      Absolute NRBCs 0.0       Imaging   OB  US 1st trimester w transvag   Final Result   IMPRESSION:    1.  Findings suspicious but not diagnostic of pregnancy failure. There   is a single intrauterine gestational sac with mean sac diameter of 21   mm but no fetal pole or yolk sac. Please correlate with beta hCG   trends and consider follow-up ultrasound in 7-13 days to assess for   viability.   2.  No adnexal masses or free fluid. No sonographically evident   abnormality in the area of pain in the left lower quadrant.   3.  Normal right ovary with a corpus luteum. The left ovary is not   visualized, obscured by bowel gas.         CARLITA FENTON MD            SYSTEM ID:  [id number]        Independent Interpretation   None    ED Course      Medications Administered   Medications - No data to display    Procedures   Procedures   None    Discussion of Management   None    ED Course   ED Course as of 08/07/24 1518   Wed Aug 07, 2024   1031 I obtained the history and examined the patient as noted above.      1205 I rechecked and updated the patient.        Additional Documentation  None    Medical Decision Making / Diagnosis     CMS Diagnoses: None    MIPS       None    MDM   Kenya Zurita is a 49 year old female presenting with left sided abdominal pain and a recent positive pregnancy test. hCG is 32,301. US shows a intrauterine gestational sac with no cardiac activity or fetal pole. This is concerning for possible early pregnancy loss. She is made aware. She is not currently having any bleeding. She is hemodynamically stable. A referral for OB/GYN is placed for her to be evaluated in clinic and to have her hCG recheck this week. Return precautions are given and she verbalizes understanding. She is discharged home in stable condition.    Disposition   The patient was discharged.     Diagnosis     ICD-10-CM    1. Concern about losing pregnancy without diagnosis  Z71.1 Ob/Gyn  Referral          Discharge Medications   Discharge Medication List as of 8/7/2024 12:26 PM        Scribe Disclosure:  I, Kelly Frances, am serving as a scribe at 10:39 AM on 8/7/2024 to document services personally performed by Inder Reynaga MD based on my observations and the provider's statements to me.        Inder Reynaga MD  08/07/24 1851

## 2024-08-07 NOTE — ED TRIAGE NOTES
Pt arrives with c/o possible ectopic pregnancy. Pt was seen at a nearby pregnancy center and they referred her to the ER. Per pt they could see a sac but nothing in it, based on sac size pregnancy is about 6 weeks along. Pt endorses some left sided abdominal pain, denies any vaginal bleeding.      Triage Assessment (Adult)       Row Name 08/07/24 1027          Triage Assessment    Airway WDL WDL        Respiratory WDL    Respiratory WDL WDL        Skin Circulation/Temperature WDL    Skin Circulation/Temperature WDL WDL        Cardiac WDL    Cardiac WDL WDL        Peripheral/Neurovascular WDL    Peripheral Neurovascular WDL WDL        Cognitive/Neuro/Behavioral WDL    Cognitive/Neuro/Behavioral WDL WDL        [place] Coma Scale    Best Eye Response 4-->(E4) spontaneous     Best Motor Response 6-->(M6) obeys commands     Best Verbal Response 5-->(V5) oriented     Ivan Coma Scale Score 15

## 2024-08-07 NOTE — DISCHARGE INSTRUCTIONS
Your blood levels all appear normal.  Your pregnancy level is elevated and the ultrasound does show a gestational sac, but there is no fetal pole or cardiac activity.  This is concerning for early pregnancy loss.  You may end up passing the pregnancy naturally over the coming days.  If you develop abdominal cramping and bleeding, this is normal.  You can take Tylenol and ibuprofen for pain.  We do want you to follow-up with OB/GYN, so they can recheck your hCG level, and possibly do a repeat ultrasound.  They can discuss further with you other management plans.  I placed a referral order so you should receive a phone call in the next 1 to 2 days to help set up an appointment with OB/GYN.  Please return the emergency department if you develop any new or concerning symptoms.

## 2024-08-08 ENCOUNTER — OFFICE VISIT (OUTPATIENT)
Dept: OBGYN | Facility: CLINIC | Age: 49
End: 2024-08-08
Attending: EMERGENCY MEDICINE
Payer: COMMERCIAL

## 2024-08-08 VITALS — DIASTOLIC BLOOD PRESSURE: 80 MMHG | SYSTOLIC BLOOD PRESSURE: 110 MMHG | WEIGHT: 132.8 LBS | BODY MASS INDEX: 23.52 KG/M2

## 2024-08-08 DIAGNOSIS — Z33.2 TERMINATION OF PREGNANCY (FETUS): Primary | ICD-10-CM

## 2024-08-08 DIAGNOSIS — O21.9 NAUSEA AND VOMITING IN PREGNANCY: ICD-10-CM

## 2024-08-08 LAB — HCG INTACT+B SERPL-ACNC: ABNORMAL MIU/ML

## 2024-08-08 PROCEDURE — 84702 CHORIONIC GONADOTROPIN TEST: CPT | Performed by: STUDENT IN AN ORGANIZED HEALTH CARE EDUCATION/TRAINING PROGRAM

## 2024-08-08 PROCEDURE — S0190 MIFEPRISTONE, ORAL, 200 MG: HCPCS | Performed by: STUDENT IN AN ORGANIZED HEALTH CARE EDUCATION/TRAINING PROGRAM

## 2024-08-08 PROCEDURE — 99204 OFFICE O/P NEW MOD 45 MIN: CPT | Performed by: STUDENT IN AN ORGANIZED HEALTH CARE EDUCATION/TRAINING PROGRAM

## 2024-08-08 PROCEDURE — 86850 RBC ANTIBODY SCREEN: CPT | Performed by: STUDENT IN AN ORGANIZED HEALTH CARE EDUCATION/TRAINING PROGRAM

## 2024-08-08 PROCEDURE — 36415 COLL VENOUS BLD VENIPUNCTURE: CPT | Performed by: STUDENT IN AN ORGANIZED HEALTH CARE EDUCATION/TRAINING PROGRAM

## 2024-08-08 PROCEDURE — 86900 BLOOD TYPING SEROLOGIC ABO: CPT | Performed by: STUDENT IN AN ORGANIZED HEALTH CARE EDUCATION/TRAINING PROGRAM

## 2024-08-08 PROCEDURE — 86901 BLOOD TYPING SEROLOGIC RH(D): CPT | Performed by: STUDENT IN AN ORGANIZED HEALTH CARE EDUCATION/TRAINING PROGRAM

## 2024-08-08 RX ORDER — METHYLPHENIDATE HYDROCHLORIDE 10 MG/1
10 TABLET ORAL
COMMUNITY
Start: 2024-02-21 | End: 2024-09-24

## 2024-08-08 RX ORDER — METHOCARBAMOL 500 MG/1
500 TABLET, FILM COATED ORAL
COMMUNITY
Start: 2024-02-24 | End: 2024-09-24

## 2024-08-08 RX ORDER — MISOPROSTOL 200 UG/1
800 TABLET ORAL EVERY 4 HOURS
Qty: 8 TABLET | Refills: 0 | Status: SHIPPED | OUTPATIENT
Start: 2024-08-08 | End: 2024-08-09

## 2024-08-08 RX ORDER — CELECOXIB 200 MG/1
200 CAPSULE ORAL 2 TIMES DAILY
COMMUNITY
Start: 2024-01-28 | End: 2024-09-24

## 2024-08-08 RX ORDER — MIFEPRISTONE 200 MG/1
200 TABLET ORAL ONCE
Status: COMPLETED | OUTPATIENT
Start: 2024-08-08 | End: 2024-08-08

## 2024-08-08 RX ORDER — LOPERAMIDE HYDROCHLORIDE 2 MG/1
2 TABLET ORAL 4 TIMES DAILY PRN
Qty: 10 TABLET | Refills: 0 | Status: SHIPPED | OUTPATIENT
Start: 2024-08-08

## 2024-08-08 RX ORDER — DEXTROAMPHETAMINE SACCHARATE, AMPHETAMINE ASPARTATE, DEXTROAMPHETAMINE SULFATE AND AMPHETAMINE SULFATE 2.5; 2.5; 2.5; 2.5 MG/1; MG/1; MG/1; MG/1
10 TABLET ORAL
COMMUNITY
Start: 2024-03-15

## 2024-08-08 RX ORDER — ESTRADIOL 10 UG/1
INSERT VAGINAL
COMMUNITY
Start: 2024-07-03

## 2024-08-08 RX ORDER — ONDANSETRON 4 MG/1
4 TABLET, ORALLY DISINTEGRATING ORAL EVERY 8 HOURS PRN
Qty: 30 TABLET | Refills: 1 | Status: SHIPPED | OUTPATIENT
Start: 2024-08-08 | End: 2024-09-24

## 2024-08-08 RX ORDER — CEFADROXIL 500 MG/1
CAPSULE ORAL
COMMUNITY
Start: 2024-04-23 | End: 2024-09-24

## 2024-08-08 RX ADMIN — MIFEPRISTONE 200 MG: 200 TABLET ORAL at 15:42

## 2024-08-08 NOTE — PATIENT INSTRUCTIONS
Kerry:    It was nice to meet you today. I forgot to mention is that for the time being I would like you to:  - Be on pelvic rest which means no intercourse, tampons, or douching.   - Avoid any strenuous activities like lifting more than 20 lbs or heavy exercising    The reason for this is to prevent an ectopic pregnancy from rupturing which would cause bleeding. As we discussed I think the risk of an ectopic pregnancy is very low but to be safe I recommend these precautions.     =================================  =================================    MEDICATION TERMINATION OF PREGNANCY USING MIFEPRISTONE AND MISOPROSTOL    HOW DOES IT WORK?    Mifepristone and misoprostol together are medications that can be taken to end your pregnancy.      HOW WELL DOES THE MEDICATION WORK?    Medication  is highly effective. Medication  is highly effective; it has a success rate of >95% using the standard protocol. Medication  is safe. Serious complications requiring hospitalization for infection treatment or transfusion occur in <0.4% of patients under the standard protocol. Failed or incomplete medication  may require a suction procedure to complete.    WHAT DO I DO?    You took one tablet of 200 mg mifepristone today during your visit or will take it at home as directed by your provider.   After taking the mifepristone, use the misoprostol.  There are two ways to take misoprostol: vaginal or buccal (between cheek and gum in your mouth).   Vaginal Use of Misoprostol (may be inserted immediately or up to 48 hrs after mifepristone)   Wash your hands and lie down. Place the 4 misoprostol tablets one at a time up into the vagina as far as you can using your finger. It doesn t matter where in the vagina you put the pills. Each misoprostol pill contains 200 micrograms.    If your provider has recommended a second dose of misoprostol, repeat the process 4 hours later with an additional 4 tablets of  misoprostol. If you have started to bleed, you can put the pills in your cheeks (between your cheek and your gums), instead of your vagina, for 30 minutes. See  Buccal Use of Misoprostol  below.    Buccal Use of Misoprostol (should be taken between 24-48 hrs after mifepristone)   Place 2 tablets of misoprostol in each cheek (between your cheek and your gums), four tablets total.    Leave them in your cheeks for 30 minutes to dissolve.    After 30 minutes swallow the pills with a large glass of water.   If your provider has recommended a second dose of misoprostol, repeat steps i. through iii. above after 4 hours.    You may take ibuprofen (800 mg total) and/or acetaminophen (650mg) by mouth one hour before using the misoprostol. This may help with cramping. See further information on pain management below.       WHAT HAPPENS NEXT?   Vaginal bleeding with clots is an expected part of this process. The cramps and bleeding may be stronger than your normal period. The cramps and heavy bleeding usually start 2 to 4 hours after you use the misoprostol pills. This heavy bleeding means the pills are working. After the pregnancy tissue passes, the bleeding will slow down and get lighter and lighter. It is normal to pass small clots and sometimes the bleeding may seem to increase when you get up suddenly or go to the toilet. Bleeding may continue on and off for up to 4 weeks.     Lower abdominal cramping pain, especially in the middle of your lower abdomen can occur any time after you take the misoprostol.? Cramping may be similar or sometimes much greater than with a menstrual period and can last for a couple of days. If you continue to have pain and cramps after taking the ibuprofen (as directed above), then you can use additional pain medicine such as acetaminophen (Tylenol).?   Nausea, diarrhea: These symptoms should get better a few hours after using the pills and should not last longer than 24 hours.    Fever and  chills: You may have fever and chills the day you take the misoprostol. It is NOT normal to have a fever after that. Call us right away if you do. It could be a sign that you are getting an infection.   You will receive a phone call from a clinic nurse within a week of your visit.    You can expect a period in 4 to 8 weeks after using mifepristone and misoprostol but this varies quite a bit depending upon a person s normal menstrual cycle.      WHAT CAN I TAKE FOR PAIN, NAUSEA, DIARRHEA?    Pain:    Take ibuprofen (Motrin or Advil) 800 mg every 8 hours as needed for pain. Take this with food to avoid an upset stomach. You may also alternate this with acetaminophen (Tylenol) 650mg every 6 hours for added pain control.   Comfort: A hot pack may help with cramps. You can also drink some hot tea. Rest in a soothing place.    Nausea   Take ondansetron or promethazine as needed for nausea and vomiting as needed for nausea and vomiting if prescribed.   Diarrhea   Take Loperamide as needed for diarrhea. Take 2 capsules after the first loose bowel movement. Can take 1 capsule after each additional loose stool. Do not take more than 8 capsules in a day.     WHEN SHOULD I CALL MY HEALTHCARE PROVIDER?    Your bleeding soaks through more than 2 maxi pads per hour for 2 hours in a row   You do not bleed within 24 hours after taking the misoprostol   You continue to feel sick more than 24 hours after taking the misoprostol:   Fever on an oral thermometer of 100.4 degrees Fahrenheit, severe stomach area (abdominal) pain, weakness, nausea, vomiting, or diarrhea     The clinic phone is answered 24 hours per day. If it is after clinic hours, leave a message with the answering service and a health care provider will call you back. Please call if you have any questions, think something is going wrong, or think you have an emergency. If you do not receive a call back within an hour, go to the nearest emergency room.              FEELINGS  AFTER ENDING PREGNANCY    People have many different feelings after using the medications to end a pregnancy. The most common emotion people report is relief, but people can also feel many other emotions. If you think your emotions are not what they should be, please talk to us.        References:   American College of Obstetricians and Gynecologists  Committee on Practice Bulletins--Gynecology, Society of Family Planning. Medication  Up to 70 Days of Gestation: ACOG Practice Bulletin, Number 225. Obstet Gynecol. 2020;136(4):e31-e47. doi: 10.1097/AOG.8402150140315697.    Reproductive Health Access Project. Medication  Aftercare Instructions (vaginal miso). May 2022. Available at: https://www.reproductiveaccess.org/wp-content/uploads/[email or web address]_VfvurogAvshGoo-final.pdf   Reproductive Health Access Project. Medication  Aftercare Instructions (buccal miso). May 2022. Available at: https://www.reproductiveaccess.org/wp-content/uploads/-english-buccal-med-ab-aftercare_final.pdf

## 2024-08-08 NOTE — NURSING NOTE
"Chief Complaint   Patient presents with    Follow Up     ED follow up   Left side pain   24  early pregnancy         Initial /80 (BP Location: Right arm, Cuff Size: Adult Regular)   Wt 60.2 kg (132 lb 12.8 oz)   LMP  (LMP Unknown)   BMI 23.52 kg/m   Estimated body mass index is 23.52 kg/m  as calculated from the following:    Height as of 24: 1.6 m (5' 3\").    Weight as of this encounter: 60.2 kg (132 lb 12.8 oz).  BP completed using cuff size: regular    Questioned patient about current smoking habits.  Pt. has never smoked.          The following HM Due: NONE      Katelyn Breaux, FRANKI on 2024 at 2:32 PM       "

## 2024-08-08 NOTE — PROGRESS NOTES
[facility]  Gynecology Office Visit    CC: Pregnancy of unknown viability    S:  Kenya Zurita is a 49 year old  who presents for the above. She is here alone.    - She has irregular periods. She is not sure when her LMP was.  - She was seen by PCP at [facility] on  for a Mirena IUD insertion. Urine pregnancy test at that visit was negative. Mirena insertion was attempted but was unsuccessful.   - At that visit she also complained of abdominal distension and pain, and pelvic discomfort. This has been chronic but worsening. CT-AP and pelvic US were ordered and completed on  which showed normal uterus without evidence of pregnancy and normal appearing ovaries and abundant stool in the colon with mild gas-distension of colon. She took Miralax and this helped a little?  - During this time she has also had a lot of nausea. She had nausea with past pregnancies. She took home UPT on  and it was positive.  - She went to [facility] pregnancy center . Had an US which showed empty gestational sac and she was referred to ED.  - In ED  had US with intrauterine gestational sac measuring 21 mm with no yolk sac or fetal pole. Right ovary normal. Left ovary not seen due to overlying bowel gas. Bhcg was 32,301. Hgb was 13.3. She did complain of LLQ pain at that visit as well as N/V and constipation. Was recommended to f/up with OB/GYN.     - Today she is still having a lot nausea. In past pregnancies she has had severe nausea and this is beginning to feel like that. Asks me directly for Zofran.  - Continues to have abdominal pain. She points to her upper abdomen just left of midline as location of pain. She reports the pain has been off and on for several weeks, usually at a 3/10 but the last few days was more severe.   - She denies any vaginal bleeding or any uterine cramping.   - This is an undesired pregnancy.   - Had been using IUDs for about 15 years. Had it removed about 1.5 years ago.  Reports immediately got pregnant. Had a medical termination with mife/miso. Initially did not bleed for 2 days but then did bleed. No reactions to the medications.     She recently moved to the area and plans to transfer her ob/gyn care to [place].    OB History:  OB History    Para Term  AB Living   4 2 1 1 1 2   SAB IAB Ectopic Multiple Live Births   0 1 0 0 2      # Outcome Date GA Lbr Ritesh/2nd Weight Sex Type Anes PTL Lv   4 IAB  6w0d    IAB         Birth Comments: Medical  at ~6 weeks   3  06 36w4d 05:38 3.629 kg (8 lb) F Vag-Spont   CARMINE      Birth Comments: Reglan pump for hyperemesis; anemia; postpartum hemorrhage      Name: Noemi      Apgar1: 6  Apgar5: 8   2 Term 10/01/04 37w0d 19:30 3.182 kg (7 lb 0.3 oz) M Vag-Spont   CARMINE      Birth Comments: Gest DM (diet controlled); hyperemesis; cholecyst 1st trim      Name: Kieran   Amaury               Gyn History:  - LMP: Unknown  - Menses: Irregular. Sometimes just a wipe of blood.  - Sexually active with male  partner  - Contraception: None  - STI history:?  - Last pap:  - NILM HPV neg  - Dyspareunia: Not discussed  - Mammogram:  - ACR 1  - Menopausal symptoms: Not fully discussed but presumably as she has been prescribed Vagifem for GSM but has not started yet.  - Hormone replacement: Yes see above    Past Medical History:  Past Medical History:   Diagnosis Date    Allergic rhinitis     Anemia     Anxiety     MINNA (generalized anxiety disorder)     History of alcohol abuse     Insomnia     Major depressive disorder, recurrent episode, moderate (H)     Pterygium eye     Severe episode of recurrent major depressive disorder, without psychotic features (H)     Sleep apnea     resolved with weight loss    Suicide attempt (H)     Tinea versicolor     Vulvovaginal warts        Problem List:  Patient Active Problem List   Diagnosis    Depression    Anxiety    Obstructive sleep apnea (adult) (pediatric)    History of  Harish-en-Y gastric bypass    Other and unspecified postsurgical nonabsorption    Allergic rhinitis    MINNA (generalized anxiety disorder)    History of alcohol abuse    History of gastric bypass    Insomnia    LGSIL on Pap smear of cervix    Major depressive disorder, recurrent, moderate (H)    Presence of intrauterine contraceptive device (IUD)    Pterygium eye       Past Surgical History:  Past Surgical History:   Procedure Laterality Date    ARTHROSCOPY TEMPOROMANDIBULAR JOINT (TMJ) BILATERAL Bilateral 5/9/2024    Procedure: bilateral temporomandibular joint arthroscopy with lysis and lavage;  Surgeon: Kj Muse DDS;  Location: [facility] OR    GALLBLADDER SURGERY  04/2004    GYN SURGERY      Colposcopy    HERNIA REPAIR  11/2014    with Tummy tuck    LAPAROSCOPIC CHOLECYSTECTOMY  01/01/2004    LASIK      PANNICULECTOMY      REVISION HARISH-EN-Y  09/01/2009    TEMPOROMANDIBULAR JOINT ARTHROPLASTY      WISDOM TOOTH EXTRACTION      ZZC BREAST AUGMENTATION Bilateral        Home Medications:  Current Outpatient Medications   Medication Instructions    acetaminophen (TYLENOL) 325-650 mg, EVERY 6 HOURS PRN    amphetamine-dextroamphetamine (ADDERALL) 10 MG tablet 10 mg, Oral    amphetamine-dextroamphetamine (ADDERALL) 5 MG tablet 5 mg, 2 TIMES DAILY    CALCIUM PO Oral    cefadroxil (DURICEF) 500 MG capsule Take 500 mg 1 hour before and 4 hours after any surgical or dental procedure per breast surgeon    cefadroxil (DURICEF) 500 mg, 2 TIMES DAILY    celecoxib (CELEBREX) 200 mg, 2 TIMES DAILY    Cholecalciferol (VITAMIN D PO) Oral    cyanocobalamin (VITAMIN B12) 1000 MCG/ML injection 1 mL, EVERY 7 DAYS    estradiol (VAGIFEM) 10 MCG TABS vaginal tablet Insert 1 tablet into the vagina twice weekly.    lamoTRIgine (LAMICTAL) 100 MG tablet Take 1/2 tablet daily for 2 weeks, then 1/2 tablet twice daily    levonorgestrel (MIRENA) 20 MCG/24HR IUD 1 Device    loperamide (IMODIUM A-D) 2 mg, Oral, 4 TIMES DAILY PRN     methocarbamol (ROBAXIN) 500 mg    methylphenidate (RITALIN) 10 mg    misoprostol (CYTOTEC) 800 mcg, Vaginal, EVERY 4 HOURS    Multiple Vitamin (MULTI-VITAMIN DAILY PO) Oral    ondansetron (ZOFRAN ODT) 4 mg, Oral, EVERY 8 HOURS PRN    propranolol (INDERAL) 10 mg, Oral, 3 TIMES DAILY    QUEtiapine (SEROQUEL) 50 MG tablet TAKE 1 TABLET (50 MG) BY MOUTH AT BEDTIME MAY INCREASE TO 2 TABLETS IF NEEDED    QUEtiapine (SEROQUEL) 100 mg, Oral, AT BEDTIME     Not taking:  Lamictal  Robaxin  Celebrex  Ritalin    Family History:  Family History   Problem Relation Age of Onset    Other Cancer Mother         Urerin    Depression Mother     Other Cancer Father         Skin    Colon Cancer Maternal Grandfather     Other Cancer Paternal Grandfather         Brain cancer    Mental Illness Brother     Bipolar Disorder Brother     Mental Illness Sister     Depression Sister     Asthma Son     Uterine Cancer Mother     Obesity Mother     Hyperlipidemia Mother     Diabetes Father     Bipolar Disorder Brother     Alcoholism Brother        Social History:  Social History     Tobacco Use    Smoking status: Never    Smokeless tobacco: Never   Vaping Use    Vaping status: Never Used   Substance Use Topics    Alcohol use: Not Currently    Drug use: Yes     Types: Marijuana     Comment: NOT IN THE LAST 24 HOURS       ROS:  A 10-point review of systems was performed and is negative except as per HPI.    O:  /80 (BP Location: Right arm, Cuff Size: Adult Regular)   Wt 60.2 kg (132 lb 12.8 oz)   LMP  (LMP Unknown)   BMI 23.52 kg/m      Exam:  GEN: Appears well, NAD  CV: Well perfused  PULM: NWOB  ABD: Soft, ttp at upper abdomen left of midline at edge of rectus. More tender when rectus is not flexed. No peritoneal signs. Minimal tenderness LLQ. Non-distended.  : Deferred    Labs:   Latest Reference Range & Units 08/07/24 10:43   Sodium 135 - 145 mmol/L 137   Potassium 3.4 - 5.3 mmol/L 4.0   Chloride 98 - 107 mmol/L 104   Carbon Dioxide  (CO2) 22 - 29 mmol/L 21 (L)   Urea Nitrogen 6.0 - 20.0 mg/dL 11.6   Creatinine 0.51 - 0.95 mg/dL 0.55   GFR Estimate >60 mL/min/1.73m2 >90   Calcium 8.8 - 10.4 mg/dL 8.8   Anion Gap 7 - 15 mmol/L 12   Glucose 70 - 99 mg/dL 84   hCG Quantitative <5 mIU/mL 32,301 (H)   WBC 4.0 - 11.0 10e3/uL 7.1   Hemoglobin 11.7 - 15.7 g/dL 13.3   Hematocrit 35.0 - 47.0 % 41.2   Platelet Count 150 - 450 10e3/uL 339   RBC Count 3.80 - 5.20 10e6/uL 4.55   MCV 78 - 100 fL 91   MCH 26.5 - 33.0 pg 29.2   MCHC 31.5 - 36.5 g/dL 32.3   RDW 10.0 - 15.0 % 12.8   % Neutrophils % 60   % Lymphocytes % 31   % Monocytes % 8   % Eosinophils % 1   % Basophils % 0   Absolute Basophils 0.0 - 0.2 10e3/uL 0.0   Absolute Eosinophils 0.0 - 0.7 10e3/uL 0.0   Absolute Immature Granulocytes <=0.4 10e3/uL 0.0   Absolute Lymphocytes 0.8 - 5.3 10e3/uL 2.2   Absolute Monocytes 0.0 - 1.3 10e3/uL 0.6   % Immature Granulocytes % 0   Absolute Neutrophils 1.6 - 8.3 10e3/uL 4.3   Absolute NRBCs 10e3/uL 0.0   NRBCs per 100 WBC <1 /100 0     Imaging:  Pelvic US 8/7/24:  US OB <14 WEEKS WITH TRANSVAGINAL SINGLE 8/7/2024 11:30 AM     CLINICAL HISTORY: recent preg pos test, outside US was reportedly  concerning for ectopic, recent preg pos test, outside US was  reportedly concerning for ectopic  TECHNIQUE: Transabdominal scans were performed. Endovaginal ultrasound  was performed to better visualize the embryo.     COMPARISON: None available. Outside hospital CT and ultrasound report  on 7/17/2024 was reviewed in the chart. These images are not  available.     FINDINGS:  UTERUS: An intrauterine gestational sac is present with mean sac  diameter of 21 mm. Minimal echogenic debris within the sac. No fetal  pole or yolk sac.     RIGHT OVARY: Normal, measuring 2.2 x 2.4 x 1.6 cm. Corpus luteum.  LEFT OVARY: The left ovary is not visualized, likely obscured by bowel  gas.     No sonographically evident abnormality under the area of pain in the  left lower quadrant. No masses in  either adnexa. No free fluid.                                                                      IMPRESSION:   1.  Findings suspicious but not diagnostic of pregnancy failure. There  is a single intrauterine gestational sac with mean sac diameter of 21  mm but no fetal pole or yolk sac. Please correlate with beta hCG  trends and consider follow-up ultrasound in 7-13 days to assess for  viability.  2.  No adnexal masses or free fluid. No sonographically evident  abnormality in the area of pain in the left lower quadrant.  3.  Normal right ovary with a corpus luteum. The left ovary is not  visualized, obscured by bowel gas.    Pelvic US 7/18/24  CLINICAL HISTORY:   Abdominal distention     TECHNIQUE:   2D gray scale and color Doppler images were acquired of the pelvis using a transvaginal approach.     FINDINGS:   On transvaginal imaging, the myometrium has a normal uniform echotexture. The uterus measures 6.3 x 4.9 x 3.3 cm. The endometrial lining appears normal and measures 5.1 mm in thickness.     The left ovary measures 2.4 x 0.8 x 1.2 cm in size and the right ovary measures 2.7 x 2.2 x 1.7 cm. The ovaries demonstrate normal arterial and venous blood flow on color Doppler analysis. There are no suspicious fluid collections within the cul-de-sac.     IMPRESSION:  No abnormalities of the uterus or ovaries identified.     CT-AP 7/18/24  INDICATION:   Abdominal distention     TECHNIQUE:   CT abdomen and pelvis without contrast.     COMPARISON:   None.     FINDINGS:   Lower chest: Unremarkable.     Liver: Normal in size and attenuation. No suspicious masses.     Gallbladder and bile ducts: Cholecystectomy. Biliary dilatation.     Pancreas: Unremarkable. No mass or inflammation.     Spleen: Normal in size. No masses.     Adrenal glands: Normal in size. No nodules.     Kidneys: Normal in size. No suspicious masses, stones, or hydronephrosis.     GI tract: Gastric bypass change. Abundant stool in the colon. Mild gaseous  distention of the colon no obstruction bowel otherwise appears unremarkable.     Vasculature: Abdominal aorta is normal in caliber.     Lymph nodes: No lymphadenopathy.     Peritoneum/Abdominal Wall: Probable small fat containing umbilical hernia containing fluid. No inflammatory stranding     Pelvis: Rim enhancing right adnexal lesion likely reflecting a corpus luteum cyst.     Bones: Unremarkable for age.     IMPRESSION:  1. No acute findings abdomen or pelvis. Abundant stool in the colon. Mild gas-filled colonic bowel loops no obstruction.     A/P:  Kenya Zurita is a 49 year old  who presents with an undesired pregnancy at approximately 7w2d based on gestational sac measurement on US on 24 with bhcg of 32,301. No fetal pole or yolk sac is seen on this ultrasound. This is compounded by acute on chronic pain in the LLQ with US unable to visualize left ovary. She also has significant nausea, consistent with early pregnancy.     The mean gestational sac diameter is 21 mm, which is suspicious for pregnancy failure. I suspect that this represents and impending miscarriage. I do suspect that this clinical scenario represents impending pregnancy failure but cannot rule out a heterotopic pregnancy or ectopic pregnancy, though I have a very low suspicion for these. Likewise, I do think that the gestational sac that is seen is actually a pseudogestational sac.     Based on this we discussed management options expectant management with repeat US in 7-11 days or termination with either medication or suction D&C. She prefers medical management.    #Medical Termination of Pregnancy  #Likely Early Pregnancy Failure  The patient is appropriate for medication  with:  mifepristone and misoprostol.     Prior to the administration/prescribing of mifepristone, the patient received the medication guide and signed the patient agreement.      Mifepristone administered in clinic which I personally observed. Patient  plans to take misoprostol by vaginal route. She will likely take this today. Due to history of delayed response to prior medication  recommend taking 2 doses of Misoprostol. Discussed the second dose can either be taken vaginally or buccally.    Counseled patient on   - Expected bleeding: Bleeding typically starts 2-4 hours after misoprostol and can be heavy for up to 2 hours. Once pregnancy tissue passes, bleeding should slow down to menstrual type flow but can last up to 2 weeks. Patient counseled to contact our clinic or present to Emergency Department in the case of heavy bleeding (soaking more than two maxi pads per hour for 2 consecutive hours). Also counseled to contact clinic if no bleeding within 24 hours after taking misoprostol.  - Pain: Counseled patient that the most severe pain occurs approximately 2-4 hours after misoprostol use and lasts 1-2 hours. Advised patient to take ibuprofen 800 mg with misoprostol and repeat as needed every 8 hours. Patient may also alternate with acetaminophen for added pain control (acetaminophen 650 mg every 6 hours).   - Success: We discussed there is a possibility that the combination of mife/miso is not successful and that suction D&C will be needed. Discussed this could also be indicated if she were to hemorrhage. Finally we discussed that in the rare case of an ectopic or heterotopic pregnancy she may also need a laparoscopy and/or methotrexate.  - Precautions: She was recommended to maintain pelvic rest and do no strenuous activity until this pregnancy episode has resolved due to small chance of ectopic or heterotopic pregnancy.  - Potential side effects of misoprostol: nausea, vomiting, diarrhea, headache, dizziness, and thermoregulatory effects such as fever, warmth, hot flushes, or chills  - She was given Rx for Zofran and Imodium. She has Tylenol and Ibuprofen at home. She has previously been advised to limit Ibuprofen usage due to gastric bypass and she  plans to do so.   - Labs: bhcg and type and screen  - Follow up plan: bHCG on Monday 8/12 (Day 5). Additional hcg testing and/or US will be planned based on results. Will plan to follow bhcg to zero.  - After this episode has resolved we will place a Mirena IUD.    Chart routed to RN team ([facility] Triage [id number]) who will follow up with patient via telephone within 1 week after clinic visit to assess patient.    #?JAVIER  - Kerry reports irregular bleeding since IUD removal 1.5 years ago. Suspect this is due to perimenopause. We did not discuss this today. We will discuss when she is seen in clinic next and determine if additional evaluation needed.    #GSM  - Reports she was given Vagifem for GSM symptoms but has not started yet. We will discuss this in more detail in next visit and will also assess if she is having bothersome hot flashes.    Dustin Young MD MPH  [facility]  08/08/2024 5:02 PM

## 2024-08-12 ENCOUNTER — LAB (OUTPATIENT)
Dept: LAB | Facility: CLINIC | Age: 49
End: 2024-08-12
Payer: COMMERCIAL

## 2024-08-12 ENCOUNTER — TELEPHONE (OUTPATIENT)
Dept: OBGYN | Facility: CLINIC | Age: 49
End: 2024-08-12

## 2024-08-12 DIAGNOSIS — Z33.2 TERMINATION OF PREGNANCY (FETUS): ICD-10-CM

## 2024-08-12 LAB — HCG INTACT+B SERPL-ACNC: 3079 MIU/ML

## 2024-08-12 PROCEDURE — 84702 CHORIONIC GONADOTROPIN TEST: CPT

## 2024-08-12 PROCEDURE — 36415 COLL VENOUS BLD VENIPUNCTURE: CPT

## 2024-08-12 NOTE — TELEPHONE ENCOUNTER
Left message to call back on voicemail on cell phone.    Need to to the [facility] f/up call.   Pt was seen and rx'd mife and miso on 8/8/24.    MAGNO Palacios RN

## 2024-08-16 NOTE — TELEPHONE ENCOUNTER
Medication Termination of Pregnancy Follow Up Assessment    Kenya Zurita is 49 year old who had a medication . Patient took mife and miso on 24 at [facility] clinic.     Bleeding: Bleeding after 24 hrs, heavy with cramping. Minimal clots. Bleeding has stopped    Infection: Patient denies signs/symptoms of infection, including fever.      Coping:  Patient reports she is feeling okay.  Patient reports feeling supported at home.  Resources for coping / grief reinforced.    Follow Up:   Patient is doing  HCG quant  for follow up.  Results expected: 24  Results will be sent to provider to finalize follow-up plan.  Patient to expect call back with results / plan.    Patient encouraged to call triage as needed.     Lay Gallardo RN

## 2024-08-19 ENCOUNTER — LAB (OUTPATIENT)
Dept: LAB | Facility: CLINIC | Age: 49
End: 2024-08-19
Payer: COMMERCIAL

## 2024-08-19 DIAGNOSIS — Z33.2 TERMINATION OF PREGNANCY (FETUS): ICD-10-CM

## 2024-08-19 LAB — HCG INTACT+B SERPL-ACNC: 160 MIU/ML

## 2024-08-19 PROCEDURE — 36415 COLL VENOUS BLD VENIPUNCTURE: CPT

## 2024-08-19 PROCEDURE — 84702 CHORIONIC GONADOTROPIN TEST: CPT

## 2024-08-26 ENCOUNTER — LAB (OUTPATIENT)
Dept: LAB | Facility: CLINIC | Age: 49
End: 2024-08-26
Payer: COMMERCIAL

## 2024-08-26 DIAGNOSIS — Z33.2 TERMINATION OF PREGNANCY (FETUS): ICD-10-CM

## 2024-08-26 LAB — HCG INTACT+B SERPL-ACNC: 21 MIU/ML

## 2024-08-26 PROCEDURE — 84702 CHORIONIC GONADOTROPIN TEST: CPT

## 2024-08-26 PROCEDURE — 36415 COLL VENOUS BLD VENIPUNCTURE: CPT

## 2024-09-04 ENCOUNTER — LAB (OUTPATIENT)
Dept: LAB | Facility: CLINIC | Age: 49
End: 2024-09-04
Payer: COMMERCIAL

## 2024-09-04 DIAGNOSIS — Z33.2 TERMINATION OF PREGNANCY (FETUS): ICD-10-CM

## 2024-09-04 LAB — HCG INTACT+B SERPL-ACNC: 4 MIU/ML

## 2024-09-04 PROCEDURE — 36415 COLL VENOUS BLD VENIPUNCTURE: CPT

## 2024-09-04 PROCEDURE — 84702 CHORIONIC GONADOTROPIN TEST: CPT

## 2024-09-19 NOTE — PROGRESS NOTES
"IUD Insertion:  CONSULT:    Is a pregnancy test required: Yes.  Was it positive or negative?  Negative  Was a consent obtained?  Yes    Subjective: Kenya Zurita is a 49 year old  presents for IUD and desires Mirena type IUD.    Patient has been given the opportunity to ask questions about all forms of birth control, including all options appropriate for Kenya Zurita. Discussed that no method of birth control, except abstinence is 100% effective against pregnancy or sexually transmitted infection.     Kenya Zurita understands she may have the IUD removed at any time. IUD should be removed by a health care provider.    The entire insertion procedure was reviewed with the patient, including care after placement.    Patient's last menstrual period was 2024. Last sexual activity: months ago . No allergy to betadine or shellfish. Patient declines STD screening  HCG Qual Urine   Date Value Ref Range Status   2024 Negative Negative Final         /68   Ht 1.6 m (5' 3\")   Wt 59.4 kg (131 lb)   LMP 2024   BMI 23.21 kg/m      Pelvic Exam:   EG/BUS: normal genital architecture without lesions, erythema or abnormal secretions.   Vagina: moist, pink, rugae with physiologic discharge and secretions  Cervix: parous no lesions and pink, moist, closed, without lesion or CMT  Uterus: mobile, no pain  Adnexa: within normal limits and no masses, nodularity, tenderness    PROCEDURE NOTE: -- IUD Insertion    Reason for Insertion: contraception      Under sterile technique, cervix was visualized with speculum and prepped with Betadine solution swab x 3. Tenaculum was placed for stability. The uterus was gently straightened and sounded to 7.0 cm. IUD prepared for placement, and IUD inserted according to 's instructions without difficulty or significant resitance, and deployed at the fundus. The strings were visualized and trimmed to 2.0 cm from the external os. Tenaculum was removed and " hemostasis noted. Speculum removed.  Patient tolerated procedure well.    EBL: minimal    Complications: none    ASSESSMENT:     ICD-10-CM    1. Pre-procedure lab exam  Z01.812 HCG qualitative urine POCT, Enter/Edit Result      2. Encounter for insertion of intrauterine contraceptive device  Z30.430            PLAN:    Given 's handouts, including when to have IUD removed, list of danger s/sx, side effects and follow up recommended. Encouraged condom use for prevention of STD. Back up contraception advised for 7 days if progestin method. Advised to call for any fever, for prolonged or severe pain or bleeding, abnormal vaginal discharge, or unable to palpate strings. She was advised to use pain medications (ibuprofen) as needed for mild to moderate pain. Advised to follow-up in clinic in 4-6 weeks for IUD string check if unable to find strings or as directed by provider.     Debora Hdez CNM

## 2024-09-24 ENCOUNTER — OFFICE VISIT (OUTPATIENT)
Dept: OBGYN | Facility: CLINIC | Age: 49
End: 2024-09-24
Payer: COMMERCIAL

## 2024-09-24 VITALS
WEIGHT: 131 LBS | HEIGHT: 63 IN | DIASTOLIC BLOOD PRESSURE: 68 MMHG | BODY MASS INDEX: 23.21 KG/M2 | SYSTOLIC BLOOD PRESSURE: 116 MMHG

## 2024-09-24 DIAGNOSIS — Z30.430 ENCOUNTER FOR INSERTION OF INTRAUTERINE CONTRACEPTIVE DEVICE: ICD-10-CM

## 2024-09-24 DIAGNOSIS — Z01.812 PRE-PROCEDURE LAB EXAM: Primary | ICD-10-CM

## 2024-09-24 LAB
HCG UR QL: NEGATIVE
INTERNAL QC OK POCT: NORMAL
POCT KIT EXPIRATION DATE: NORMAL
POCT KIT LOT NUMBER: NORMAL

## 2024-09-24 PROCEDURE — 81025 URINE PREGNANCY TEST: CPT | Performed by: ADVANCED PRACTICE MIDWIFE

## 2024-09-24 PROCEDURE — 58300 INSERT INTRAUTERINE DEVICE: CPT | Performed by: ADVANCED PRACTICE MIDWIFE

## 2024-11-25 NOTE — DISCHARGE INSTRUCTIONS
GENERAL SURGERY CLINIC  FOLLOW UP NOTE      Name: Oneyda Parmar  MRN: [id number]      Index Surgery  Date of Surgery: 11/20/24   Surgeon: Dr. Chaudhary    Surgical Procedure: Other: REPAIR OF INCARCERATED  PARAESOPHAGEAL HERNIA, DIAGNOSTIC LAPAROSCOPY, EGD/ TAPBLOCK     HPI:   Presenting for follow up visit.    Concerns related to:  Nausea/Vomiting, Reflux: no  Abdominal Pain: no  Diarrhea/Constipation no        REVIEW OF SYSTEMS:  CONSTITUTIONAL: Patient denies fevers, chills, sweats and weight changes.  CARDIOVASCULAR: Patient denies chest pains, palpitations, orthopnea and paroxysmal nocturnal dyspnea.  RESPIRATORY: No dyspnea on exertion, no wheezing or cough.  GI: No nausea, vomiting, diarrhea, constipation, abdominal pain, hematochezia or melena.  MUSCULOSKELETAL: No myalgias or arthralgias.  NEUROLOGIC: No chronic headaches, no seizures. Patient denies numbness, tingling or weakness.  PSYCHIATRIC: Patient denies problems with mood disturbance. No problems with anxiety.  ENDOCRINE: No excessive urination or excessive thirst.  DERMATOLOGIC: rash at torso     PHYSICAL EXAM:        ASSESSMENT & PLAN:  72 y.o. female presenting for follow up visit s/p hiatal hernia repair     Tolerating fulls, no N,V,CP,SOB,F,chills or d     C/O itching and allergic reaction     Benadryl , Zyrtec , Medrol pack for allergic reaction     Tylenol for pain, tramadol only if moderate pain     Advance to puree diet     Cont PPI     Advance activity as tolerated with limitations     Plan:    Follow up 5 weeks              Same Day Surgery Discharge Instructions for  Sedation and General Anesthesia     It's not unusual to feel dizzy, light-headed or faint for up to 24 hours after surgery or while taking pain medication.  If you have these symptoms: sit for a few minutes before standing and have someone assist you when you get up to walk or use the bathroom.    You should rest and relax for the next 24 hours. We recommend you make arrangements to have an adult stay with you for at least 24 hours after your discharge.  Avoid hazardous and strenuous activity.    DO NOT DRIVE any vehicle or operate mechanical equipment for 24 hours following the end of your surgery.  Even though you may feel normal, your reactions may be affected by the medication you have received.    Do not drink alcoholic beverages for 24 hours following surgery.     Slowly progress to your regular diet as you feel able. It's not unusual to feel nauseated and/or vomit after receiving anesthesia.  If you develop these symptoms, drink clear liquids (apple juice, ginger ale, broth, 7-up, etc. ) until you feel better.  If your nausea and vomiting persists for 24 hours, please notify your surgeon.      All narcotic pain medications, along with inactivity and anesthesia, can cause constipation. Drinking plenty of liquids and increasing fiber intake will help.    For any questions of a medical nature, call your surgeon.    Do not make important decisions for 24 hours.    If you had general anesthesia, you may have a sore throat for a couple of days related to the breathing tube used during surgery.  You may use Cepacol lozenges to help with this discomfort.  If it worsens or if you develop a fever, contact your surgeon.     If you feel your pain is not well managed with the pain medications prescribed by your surgeon, please contact your surgeon's office to let them know so they can address your concerns.       Reasons to contact your surgeon:    Signs of possible infection:  Check your incision daily for redness, swelling, warmth, red streaks or foul drainage.   Elevated temperature.  Pain not controlled with pain medication and/or rest.   Uncontrolled nausea or vomiting.  Any questions or concerns.        - Okay to shower tomorrow 5/10, can dab/wipe over incisional sites this evening 5/9.    - Diet as tolerated.    **If you have questions or concerns about your procedure,  call Dr. Muse at [phone]**

## 2025-02-15 ENCOUNTER — HOSPITAL ENCOUNTER (INPATIENT)
Facility: CLINIC | Age: 50
LOS: 2 days | Discharge: HOME | End: 2025-02-17
Attending: EMERGENCY MEDICINE | Admitting: INTERNAL MEDICINE
Payer: COMMERCIAL

## 2025-02-15 ENCOUNTER — APPOINTMENT (OUTPATIENT)
Dept: CT IMAGING | Facility: CLINIC | Age: 50
End: 2025-02-15
Attending: EMERGENCY MEDICINE
Payer: COMMERCIAL

## 2025-02-15 DIAGNOSIS — K42.9 RECURRENT UMBILICAL HERNIA: ICD-10-CM

## 2025-02-15 DIAGNOSIS — Z97.5 PRESENCE OF INTRAUTERINE CONTRACEPTIVE DEVICE: ICD-10-CM

## 2025-02-15 DIAGNOSIS — E87.20 LACTIC ACIDOSIS: ICD-10-CM

## 2025-02-15 DIAGNOSIS — Z90.49 ACQUIRED ABSENCE OF LARGE INTESTINE: ICD-10-CM

## 2025-02-15 DIAGNOSIS — Z98.84 BARIATRIC SURGERY STATUS: ICD-10-CM

## 2025-02-15 DIAGNOSIS — A41.9 UNSPECIFIED SEPTICEMIA(038.9) (H): ICD-10-CM

## 2025-02-15 DIAGNOSIS — R10.33 PERIUMBILICAL ABDOMINAL PAIN: ICD-10-CM

## 2025-02-15 DIAGNOSIS — K42.9 UMBILICAL HERNIA WITHOUT OBSTRUCTION AND WITHOUT GANGRENE: Primary | ICD-10-CM

## 2025-02-15 LAB
ALBUMIN SERPL BCG-MCNC: 4.6 G/DL (ref 3.5–5.2)
ALBUMIN UR-MCNC: NEGATIVE MG/DL
ALP SERPL-CCNC: 66 U/L (ref 40–150)
ALT SERPL W P-5'-P-CCNC: 14 U/L (ref 0–50)
ANION GAP SERPL CALCULATED.3IONS-SCNC: 13 MMOL/L (ref 7–15)
APPEARANCE UR: CLEAR
AST SERPL W P-5'-P-CCNC: 21 U/L (ref 0–45)
BASOPHILS # BLD AUTO: 0 10E3/UL (ref 0–0.2)
BASOPHILS NFR BLD AUTO: 0 %
BILIRUB SERPL-MCNC: 0.3 MG/DL
BILIRUB UR QL STRIP: NEGATIVE
BUN SERPL-MCNC: 11 MG/DL (ref 6–20)
CALCIUM SERPL-MCNC: 9.5 MG/DL (ref 8.8–10.4)
CHLORIDE SERPL-SCNC: 102 MMOL/L (ref 98–107)
COLOR UR AUTO: ABNORMAL
CREAT SERPL-MCNC: 0.56 MG/DL (ref 0.51–0.95)
EGFRCR SERPLBLD CKD-EPI 2021: >90 ML/MIN/1.73M2
EOSINOPHIL # BLD AUTO: 0.1 10E3/UL (ref 0–0.7)
EOSINOPHIL NFR BLD AUTO: 1 %
ERYTHROCYTE [DISTWIDTH] IN BLOOD BY AUTOMATED COUNT: 13.9 % (ref 10–15)
GLUCOSE SERPL-MCNC: 117 MG/DL (ref 70–99)
GLUCOSE UR STRIP-MCNC: NEGATIVE MG/DL
HCG SERPL QL: NEGATIVE
HCO3 SERPL-SCNC: 24 MMOL/L (ref 22–29)
HCT VFR BLD AUTO: 39.5 % (ref 35–47)
HGB BLD-MCNC: 12.9 G/DL (ref 11.7–15.7)
HGB UR QL STRIP: NEGATIVE
IMM GRANULOCYTES # BLD: 0 10E3/UL
IMM GRANULOCYTES NFR BLD: 0 %
KETONES UR STRIP-MCNC: 20 MG/DL
LACTATE SERPL-SCNC: 1 MMOL/L (ref 0.7–2)
LACTATE SERPL-SCNC: 2.1 MMOL/L (ref 0.7–2)
LEUKOCYTE ESTERASE UR QL STRIP: NEGATIVE
LIPASE SERPL-CCNC: 62 U/L (ref 13–60)
LYMPHOCYTES # BLD AUTO: 2 10E3/UL (ref 0.8–5.3)
LYMPHOCYTES NFR BLD AUTO: 18 %
MCH RBC QN AUTO: 29.3 PG (ref 26.5–33)
MCHC RBC AUTO-ENTMCNC: 32.7 G/DL (ref 31.5–36.5)
MCV RBC AUTO: 90 FL (ref 78–100)
MONOCYTES # BLD AUTO: 0.6 10E3/UL (ref 0–1.3)
MONOCYTES NFR BLD AUTO: 5 %
NEUTROPHILS # BLD AUTO: 8.4 10E3/UL (ref 1.6–8.3)
NEUTROPHILS NFR BLD AUTO: 75 %
NITRATE UR QL: NEGATIVE
NRBC # BLD AUTO: 0 10E3/UL
NRBC BLD AUTO-RTO: 0 /100
PH UR STRIP: 5.5 [PH] (ref 5–7)
PLATELET # BLD AUTO: 343 10E3/UL (ref 150–450)
POTASSIUM SERPL-SCNC: 3.9 MMOL/L (ref 3.4–5.3)
PROCALCITONIN SERPL IA-MCNC: <0.02 NG/ML
PROT SERPL-MCNC: 7.7 G/DL (ref 6.4–8.3)
RBC # BLD AUTO: 4.41 10E6/UL (ref 3.8–5.2)
RBC URINE: <1 /HPF
SODIUM SERPL-SCNC: 139 MMOL/L (ref 135–145)
SP GR UR STRIP: 1.01 (ref 1–1.03)
SQUAMOUS EPITHELIAL: <1 /HPF
T4 FREE SERPL-MCNC: 1.38 NG/DL (ref 0.9–1.7)
TROPONIN T SERPL HS-MCNC: <6 NG/L
TSH SERPL DL<=0.005 MIU/L-ACNC: 5.59 UIU/ML (ref 0.3–4.2)
UROBILINOGEN UR STRIP-MCNC: NORMAL MG/DL
WBC # BLD AUTO: 11.1 10E3/UL (ref 4–11)
WBC URINE: 3 /HPF

## 2025-02-15 PROCEDURE — 250N000011 HC RX IP 250 OP 636: Performed by: EMERGENCY MEDICINE

## 2025-02-15 PROCEDURE — 84703 CHORIONIC GONADOTROPIN ASSAY: CPT | Performed by: EMERGENCY MEDICINE

## 2025-02-15 PROCEDURE — 74177 CT ABD & PELVIS W/CONTRAST: CPT

## 2025-02-15 PROCEDURE — 93005 ELECTROCARDIOGRAM TRACING: CPT | Performed by: EMERGENCY MEDICINE

## 2025-02-15 PROCEDURE — 93010 ELECTROCARDIOGRAM REPORT: CPT | Performed by: EMERGENCY MEDICINE

## 2025-02-15 PROCEDURE — 83690 ASSAY OF LIPASE: CPT | Performed by: EMERGENCY MEDICINE

## 2025-02-15 PROCEDURE — 96366 THER/PROPH/DIAG IV INF ADDON: CPT | Performed by: EMERGENCY MEDICINE

## 2025-02-15 PROCEDURE — 250N000009 HC RX 250: Performed by: EMERGENCY MEDICINE

## 2025-02-15 PROCEDURE — 120N000004 HC R&B MS OVERFLOW

## 2025-02-15 PROCEDURE — 36415 COLL VENOUS BLD VENIPUNCTURE: CPT | Performed by: EMERGENCY MEDICINE

## 2025-02-15 PROCEDURE — 84145 PROCALCITONIN (PCT): CPT | Performed by: EMERGENCY MEDICINE

## 2025-02-15 PROCEDURE — 258N000003 HC RX IP 258 OP 636: Performed by: EMERGENCY MEDICINE

## 2025-02-15 PROCEDURE — 83605 ASSAY OF LACTIC ACID: CPT | Performed by: EMERGENCY MEDICINE

## 2025-02-15 PROCEDURE — 87077 CULTURE AEROBIC IDENTIFY: CPT | Performed by: EMERGENCY MEDICINE

## 2025-02-15 PROCEDURE — 84439 ASSAY OF FREE THYROXINE: CPT | Performed by: EMERGENCY MEDICINE

## 2025-02-15 PROCEDURE — 84443 ASSAY THYROID STIM HORMONE: CPT | Performed by: EMERGENCY MEDICINE

## 2025-02-15 PROCEDURE — 81001 URINALYSIS AUTO W/SCOPE: CPT | Performed by: EMERGENCY MEDICINE

## 2025-02-15 PROCEDURE — 84295 ASSAY OF SERUM SODIUM: CPT | Performed by: EMERGENCY MEDICINE

## 2025-02-15 PROCEDURE — 96365 THER/PROPH/DIAG IV INF INIT: CPT | Mod: 59 | Performed by: EMERGENCY MEDICINE

## 2025-02-15 PROCEDURE — 96361 HYDRATE IV INFUSION ADD-ON: CPT | Performed by: EMERGENCY MEDICINE

## 2025-02-15 PROCEDURE — 96367 TX/PROPH/DG ADDL SEQ IV INF: CPT | Performed by: EMERGENCY MEDICINE

## 2025-02-15 PROCEDURE — 96374 THER/PROPH/DIAG INJ IV PUSH: CPT | Mod: 59 | Performed by: EMERGENCY MEDICINE

## 2025-02-15 PROCEDURE — 99285 EMERGENCY DEPT VISIT HI MDM: CPT | Mod: 25 | Performed by: EMERGENCY MEDICINE

## 2025-02-15 PROCEDURE — 87149 DNA/RNA DIRECT PROBE: CPT | Performed by: EMERGENCY MEDICINE

## 2025-02-15 PROCEDURE — 99285 EMERGENCY DEPT VISIT HI MDM: CPT | Performed by: EMERGENCY MEDICINE

## 2025-02-15 PROCEDURE — 85025 COMPLETE CBC W/AUTO DIFF WBC: CPT | Performed by: EMERGENCY MEDICINE

## 2025-02-15 PROCEDURE — 84484 ASSAY OF TROPONIN QUANT: CPT | Performed by: EMERGENCY MEDICINE

## 2025-02-15 RX ORDER — FENTANYL CITRATE 50 UG/ML
100 INJECTION, SOLUTION INTRAMUSCULAR; INTRAVENOUS ONCE
Status: COMPLETED | OUTPATIENT
Start: 2025-02-15 | End: 2025-02-15

## 2025-02-15 RX ORDER — PIPERACILLIN SODIUM, TAZOBACTAM SODIUM 3; .375 G/15ML; G/15ML
3.38 INJECTION, POWDER, LYOPHILIZED, FOR SOLUTION INTRAVENOUS EVERY 6 HOURS
Status: DISCONTINUED | OUTPATIENT
Start: 2025-02-15 | End: 2025-02-16

## 2025-02-15 RX ORDER — IOPAMIDOL 755 MG/ML
59 INJECTION, SOLUTION INTRAVASCULAR ONCE
Status: COMPLETED | OUTPATIENT
Start: 2025-02-15 | End: 2025-02-15

## 2025-02-15 RX ADMIN — SODIUM CHLORIDE 1250 MG: 0.9 INJECTION, SOLUTION INTRAVENOUS at 21:31

## 2025-02-15 RX ADMIN — FENTANYL CITRATE 100 MCG: 50 INJECTION INTRAMUSCULAR; INTRAVENOUS at 20:59

## 2025-02-15 RX ADMIN — SODIUM CHLORIDE 54 ML: 9 INJECTION, SOLUTION INTRAVENOUS at 19:11

## 2025-02-15 RX ADMIN — IOPAMIDOL 59 ML: 755 INJECTION, SOLUTION INTRAVENOUS at 19:11

## 2025-02-15 RX ADMIN — PIPERACILLIN AND TAZOBACTAM 3.38 G: 3; .375 INJECTION, POWDER, FOR SOLUTION INTRAVENOUS at 20:56

## 2025-02-15 RX ADMIN — SODIUM CHLORIDE 1000 ML: 9 INJECTION, SOLUTION INTRAVENOUS at 19:43

## 2025-02-15 ASSESSMENT — COLUMBIA-SUICIDE SEVERITY RATING SCALE - C-SSRS
6. HAVE YOU EVER DONE ANYTHING, STARTED TO DO ANYTHING, OR PREPARED TO DO ANYTHING TO END YOUR LIFE?: YES
2. HAVE YOU ACTUALLY HAD ANY THOUGHTS OF KILLING YOURSELF IN THE PAST MONTH?: NO
1. IN THE PAST MONTH, HAVE YOU WISHED YOU WERE DEAD OR WISHED YOU COULD GO TO SLEEP AND NOT WAKE UP?: NO

## 2025-02-15 ASSESSMENT — ACTIVITIES OF DAILY LIVING (ADL)
ADLS_ACUITY_SCORE: 41

## 2025-02-15 NOTE — LETTER
February 17, 2025      Kenya Zurita  [address] [address] [address]        To Whom It May Concern:    Kenya Zurita was seen in our facility. She should be excused from work for 1 week starting 2/17/25. Starting 2/24/25, she can return to work with light restrictions (no lifting more than 20 lbs, no strenuous activity). She will be seen in my clinic for a re-evaluation in about 2 weeks. At that time, we will determine if she is safe to return to full activities.      Sincerely,    Vj Carlton MD     Electronically signed

## 2025-02-15 NOTE — LETTER
February 17, 2025      Kenya Zurita  [address] [address] [address]        To Whom It May Concern:    Kenya Zurita was seen in our facility. She should be excused from work for 1 week starting 2/17/25. 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 She will be seen in my clinic for a re-evaluation in about 2 weeks. At that time, we will determine if she is safe to return to full activities.      Sincerely,    Vj Carlton MD     Electronically signed

## 2025-02-16 ENCOUNTER — ANESTHESIA EVENT (OUTPATIENT)
Dept: SURGERY | Facility: CLINIC | Age: 50
End: 2025-02-16
Payer: COMMERCIAL

## 2025-02-16 ENCOUNTER — ANESTHESIA (OUTPATIENT)
Dept: SURGERY | Facility: CLINIC | Age: 50
End: 2025-02-16
Payer: COMMERCIAL

## 2025-02-16 LAB
ANION GAP SERPL CALCULATED.3IONS-SCNC: 11 MMOL/L (ref 7–15)
ATRIAL RATE - MUSE: 54 BPM
BUN SERPL-MCNC: 9.6 MG/DL (ref 6–20)
CALCIUM SERPL-MCNC: 8.3 MG/DL (ref 8.8–10.4)
CHLORIDE SERPL-SCNC: 109 MMOL/L (ref 98–107)
CREAT SERPL-MCNC: 0.65 MG/DL (ref 0.51–0.95)
DIASTOLIC BLOOD PRESSURE - MUSE: NORMAL MMHG
EGFRCR SERPLBLD CKD-EPI 2021: >90 ML/MIN/1.73M2
ENTEROCOCCUS FAECALIS: NOT DETECTED
ENTEROCOCCUS FAECIUM: NOT DETECTED
ERYTHROCYTE [DISTWIDTH] IN BLOOD BY AUTOMATED COUNT: 14.2 % (ref 10–15)
GLUCOSE SERPL-MCNC: 99 MG/DL (ref 70–99)
HCO3 SERPL-SCNC: 21 MMOL/L (ref 22–29)
HCT VFR BLD AUTO: 33.8 % (ref 35–47)
HGB BLD-MCNC: 10.7 G/DL (ref 11.7–15.7)
INTERPRETATION ECG - MUSE: NORMAL
LIPASE SERPL-CCNC: 30 U/L (ref 13–60)
LISTERIA SPECIES (DETECTED/NOT DETECTED): NOT DETECTED
MCH RBC QN AUTO: 28.7 PG (ref 26.5–33)
MCHC RBC AUTO-ENTMCNC: 31.7 G/DL (ref 31.5–36.5)
MCV RBC AUTO: 91 FL (ref 78–100)
P AXIS - MUSE: 57 DEGREES
PLATELET # BLD AUTO: 293 10E3/UL (ref 150–450)
POTASSIUM SERPL-SCNC: 3.6 MMOL/L (ref 3.4–5.3)
PR INTERVAL - MUSE: 104 MS
QRS DURATION - MUSE: 86 MS
QT - MUSE: 444 MS
QTC - MUSE: 421 MS
R AXIS - MUSE: 59 DEGREES
RBC # BLD AUTO: 3.73 10E6/UL (ref 3.8–5.2)
SODIUM SERPL-SCNC: 141 MMOL/L (ref 135–145)
STAPHYLOCOCCUS AUREUS: NOT DETECTED
STAPHYLOCOCCUS EPIDERMIDIS: DETECTED
STAPHYLOCOCCUS LUGDUNENSIS: NOT DETECTED
STREPTOCOCCUS AGALACTIAE: NOT DETECTED
STREPTOCOCCUS ANGINOSUS GROUP: NOT DETECTED
STREPTOCOCCUS PNEUMONIAE: NOT DETECTED
STREPTOCOCCUS PYOGENES: NOT DETECTED
STREPTOCOCCUS SPECIES: NOT DETECTED
SYSTOLIC BLOOD PRESSURE - MUSE: NORMAL MMHG
T AXIS - MUSE: 54 DEGREES
VENTRICULAR RATE- MUSE: 54 BPM
WBC # BLD AUTO: 6.5 10E3/UL (ref 4–11)

## 2025-02-16 PROCEDURE — 250N000011 HC RX IP 250 OP 636: Performed by: EMERGENCY MEDICINE

## 2025-02-16 PROCEDURE — 360N000075 HC SURGERY LEVEL 2, PER MIN: Performed by: STUDENT IN AN ORGANIZED HEALTH CARE EDUCATION/TRAINING PROGRAM

## 2025-02-16 PROCEDURE — 88302 TISSUE EXAM BY PATHOLOGIST: CPT | Mod: TC | Performed by: STUDENT IN AN ORGANIZED HEALTH CARE EDUCATION/TRAINING PROGRAM

## 2025-02-16 PROCEDURE — 85041 AUTOMATED RBC COUNT: CPT | Performed by: INTERNAL MEDICINE

## 2025-02-16 PROCEDURE — 250N000011 HC RX IP 250 OP 636: Performed by: INTERNAL MEDICINE

## 2025-02-16 PROCEDURE — 82565 ASSAY OF CREATININE: CPT | Performed by: INTERNAL MEDICINE

## 2025-02-16 PROCEDURE — 250N000025 HC SEVOFLURANE, PER MIN: Performed by: STUDENT IN AN ORGANIZED HEALTH CARE EDUCATION/TRAINING PROGRAM

## 2025-02-16 PROCEDURE — 83690 ASSAY OF LIPASE: CPT | Performed by: INTERNAL MEDICINE

## 2025-02-16 PROCEDURE — 710N000009 HC RECOVERY PHASE 1, LEVEL 1, PER MIN: Performed by: STUDENT IN AN ORGANIZED HEALTH CARE EDUCATION/TRAINING PROGRAM

## 2025-02-16 PROCEDURE — 36415 COLL VENOUS BLD VENIPUNCTURE: CPT | Performed by: INTERNAL MEDICINE

## 2025-02-16 PROCEDURE — 258N000003 HC RX IP 258 OP 636: Performed by: INTERNAL MEDICINE

## 2025-02-16 PROCEDURE — 271N000001 HC OR GENERAL SUPPLY NON-STERILE: Performed by: STUDENT IN AN ORGANIZED HEALTH CARE EDUCATION/TRAINING PROGRAM

## 2025-02-16 PROCEDURE — 88304 TISSUE EXAM BY PATHOLOGIST: CPT | Mod: 26 | Performed by: PATHOLOGY

## 2025-02-16 PROCEDURE — 250N000013 HC RX MED GY IP 250 OP 250 PS 637: Performed by: STUDENT IN AN ORGANIZED HEALTH CARE EDUCATION/TRAINING PROGRAM

## 2025-02-16 PROCEDURE — 250N000011 HC RX IP 250 OP 636: Performed by: NURSE ANESTHETIST, CERTIFIED REGISTERED

## 2025-02-16 PROCEDURE — 99221 1ST HOSP IP/OBS SF/LOW 40: CPT | Mod: 57 | Performed by: STUDENT IN AN ORGANIZED HEALTH CARE EDUCATION/TRAINING PROGRAM

## 2025-02-16 PROCEDURE — 120N000001 HC R&B MED SURG/OB

## 2025-02-16 PROCEDURE — 370N000017 HC ANESTHESIA TECHNICAL FEE, PER MIN: Performed by: STUDENT IN AN ORGANIZED HEALTH CARE EDUCATION/TRAINING PROGRAM

## 2025-02-16 PROCEDURE — 99223 1ST HOSP IP/OBS HIGH 75: CPT | Mod: 95 | Performed by: INTERNAL MEDICINE

## 2025-02-16 PROCEDURE — 250N000013 HC RX MED GY IP 250 OP 250 PS 637: Performed by: INTERNAL MEDICINE

## 2025-02-16 PROCEDURE — 250N000011 HC RX IP 250 OP 636: Performed by: STUDENT IN AN ORGANIZED HEALTH CARE EDUCATION/TRAINING PROGRAM

## 2025-02-16 PROCEDURE — 0WUF0JZ SUPPLEMENT ABDOMINAL WALL WITH SYNTHETIC SUBSTITUTE, OPEN APPROACH: ICD-10-PCS | Performed by: STUDENT IN AN ORGANIZED HEALTH CARE EDUCATION/TRAINING PROGRAM

## 2025-02-16 PROCEDURE — 49592 RPR AA HRN 1ST < 3 NCR/STRN: CPT | Performed by: STUDENT IN AN ORGANIZED HEALTH CARE EDUCATION/TRAINING PROGRAM

## 2025-02-16 PROCEDURE — 99207 PR NOT IN PERSON INPATIENT CONSULT STATISTICAL MARKER: CPT | Performed by: INTERNAL MEDICINE

## 2025-02-16 PROCEDURE — 272N000001 HC OR GENERAL SUPPLY STERILE: Performed by: STUDENT IN AN ORGANIZED HEALTH CARE EDUCATION/TRAINING PROGRAM

## 2025-02-16 PROCEDURE — 99207 PR APP CREDIT; MD BILLING SHARED VISIT: CPT | Performed by: STUDENT IN AN ORGANIZED HEALTH CARE EDUCATION/TRAINING PROGRAM

## 2025-02-16 PROCEDURE — 258N000003 HC RX IP 258 OP 636: Performed by: NURSE ANESTHETIST, CERTIFIED REGISTERED

## 2025-02-16 RX ORDER — FENTANYL CITRATE 50 UG/ML
25 INJECTION, SOLUTION INTRAMUSCULAR; INTRAVENOUS EVERY 5 MIN PRN
Status: DISCONTINUED | OUTPATIENT
Start: 2025-02-16 | End: 2025-02-16 | Stop reason: HOSPADM

## 2025-02-16 RX ORDER — POLYETHYLENE GLYCOL 3350 17 G/17G
17 POWDER, FOR SOLUTION ORAL DAILY
Status: DISCONTINUED | OUTPATIENT
Start: 2025-02-17 | End: 2025-02-17 | Stop reason: HOSPADM

## 2025-02-16 RX ORDER — DOCUSATE SODIUM 100 MG/1
100 CAPSULE, LIQUID FILLED ORAL 2 TIMES DAILY
Status: DISCONTINUED | OUTPATIENT
Start: 2025-02-16 | End: 2025-02-17 | Stop reason: HOSPADM

## 2025-02-16 RX ORDER — FENTANYL CITRATE-0.9 % NACL/PF 10 MCG/ML
100 PLASTIC BAG, INJECTION (ML) INTRAVENOUS EVERY 5 MIN PRN
Status: DISCONTINUED | OUTPATIENT
Start: 2025-02-16 | End: 2025-02-16 | Stop reason: HOSPADM

## 2025-02-16 RX ORDER — LIDOCAINE 40 MG/G
CREAM TOPICAL
Status: DISCONTINUED | OUTPATIENT
Start: 2025-02-16 | End: 2025-02-17 | Stop reason: HOSPADM

## 2025-02-16 RX ORDER — NALOXONE HYDROCHLORIDE 0.4 MG/ML
0.4 INJECTION, SOLUTION INTRAMUSCULAR; INTRAVENOUS; SUBCUTANEOUS
Status: DISCONTINUED | OUTPATIENT
Start: 2025-02-16 | End: 2025-02-17 | Stop reason: HOSPADM

## 2025-02-16 RX ORDER — BISACODYL 10 MG
10 SUPPOSITORY, RECTAL RECTAL DAILY PRN
Status: DISCONTINUED | OUTPATIENT
Start: 2025-02-19 | End: 2025-02-17 | Stop reason: HOSPADM

## 2025-02-16 RX ORDER — DEXTROAMPHETAMINE SACCHARATE, AMPHETAMINE ASPARTATE, DEXTROAMPHETAMINE SULFATE AND AMPHETAMINE SULFATE 1.25; 1.25; 1.25; 1.25 MG/1; MG/1; MG/1; MG/1
10 TABLET ORAL 3 TIMES DAILY
Status: DISCONTINUED | OUTPATIENT
Start: 2025-02-16 | End: 2025-02-16

## 2025-02-16 RX ORDER — BUPIVACAINE HYDROCHLORIDE 5 MG/ML
INJECTION, SOLUTION PERINEURAL PRN
Status: DISCONTINUED | OUTPATIENT
Start: 2025-02-16 | End: 2025-02-16 | Stop reason: HOSPADM

## 2025-02-16 RX ORDER — DEXAMETHASONE SODIUM PHOSPHATE 4 MG/ML
INJECTION, SOLUTION INTRA-ARTICULAR; INTRALESIONAL; INTRAMUSCULAR; INTRAVENOUS; SOFT TISSUE PRN
Status: DISCONTINUED | OUTPATIENT
Start: 2025-02-16 | End: 2025-02-16

## 2025-02-16 RX ORDER — ONDANSETRON 2 MG/ML
INJECTION INTRAMUSCULAR; INTRAVENOUS PRN
Status: DISCONTINUED | OUTPATIENT
Start: 2025-02-16 | End: 2025-02-16

## 2025-02-16 RX ORDER — ONDANSETRON 2 MG/ML
4 INJECTION INTRAMUSCULAR; INTRAVENOUS EVERY 6 HOURS PRN
Status: DISCONTINUED | OUTPATIENT
Start: 2025-02-16 | End: 2025-02-17 | Stop reason: HOSPADM

## 2025-02-16 RX ORDER — AMOXICILLIN 250 MG
1 CAPSULE ORAL 2 TIMES DAILY PRN
Status: DISCONTINUED | OUTPATIENT
Start: 2025-02-16 | End: 2025-02-17 | Stop reason: HOSPADM

## 2025-02-16 RX ORDER — PROPOFOL 10 MG/ML
INJECTION, EMULSION INTRAVENOUS PRN
Status: DISCONTINUED | OUTPATIENT
Start: 2025-02-16 | End: 2025-02-16

## 2025-02-16 RX ORDER — GLYCOPYRROLATE 0.2 MG/ML
INJECTION, SOLUTION INTRAMUSCULAR; INTRAVENOUS PRN
Status: DISCONTINUED | OUTPATIENT
Start: 2025-02-16 | End: 2025-02-16

## 2025-02-16 RX ORDER — AMOXICILLIN 250 MG
1 CAPSULE ORAL 2 TIMES DAILY
Status: DISCONTINUED | OUTPATIENT
Start: 2025-02-16 | End: 2025-02-17 | Stop reason: HOSPADM

## 2025-02-16 RX ORDER — NALOXONE HYDROCHLORIDE 0.4 MG/ML
0.2 INJECTION, SOLUTION INTRAMUSCULAR; INTRAVENOUS; SUBCUTANEOUS
Status: DISCONTINUED | OUTPATIENT
Start: 2025-02-16 | End: 2025-02-17 | Stop reason: HOSPADM

## 2025-02-16 RX ORDER — MEPERIDINE HYDROCHLORIDE 25 MG/ML
INJECTION INTRAMUSCULAR; INTRAVENOUS; SUBCUTANEOUS PRN
Status: DISCONTINUED | OUTPATIENT
Start: 2025-02-16 | End: 2025-02-16

## 2025-02-16 RX ORDER — SODIUM CHLORIDE, SODIUM LACTATE, POTASSIUM CHLORIDE, CALCIUM CHLORIDE 600; 310; 30; 20 MG/100ML; MG/100ML; MG/100ML; MG/100ML
INJECTION, SOLUTION INTRAVENOUS CONTINUOUS
Status: DISCONTINUED | OUTPATIENT
Start: 2025-02-16 | End: 2025-02-16 | Stop reason: HOSPADM

## 2025-02-16 RX ORDER — DEXTROAMPHETAMINE SACCHARATE, AMPHETAMINE ASPARTATE, DEXTROAMPHETAMINE SULFATE AND AMPHETAMINE SULFATE 1.25; 1.25; 1.25; 1.25 MG/1; MG/1; MG/1; MG/1
10 TABLET ORAL 3 TIMES DAILY PRN
Status: DISCONTINUED | OUTPATIENT
Start: 2025-02-16 | End: 2025-02-17 | Stop reason: HOSPADM

## 2025-02-16 RX ORDER — LIDOCAINE HYDROCHLORIDE AND EPINEPHRINE 10; 10 MG/ML; UG/ML
INJECTION, SOLUTION INFILTRATION; PERINEURAL PRN
Status: DISCONTINUED | OUTPATIENT
Start: 2025-02-16 | End: 2025-02-16 | Stop reason: HOSPADM

## 2025-02-16 RX ORDER — FENTANYL CITRATE 50 UG/ML
INJECTION, SOLUTION INTRAMUSCULAR; INTRAVENOUS PRN
Status: DISCONTINUED | OUTPATIENT
Start: 2025-02-16 | End: 2025-02-16

## 2025-02-16 RX ORDER — FENTANYL CITRATE 50 UG/ML
50 INJECTION, SOLUTION INTRAMUSCULAR; INTRAVENOUS EVERY 5 MIN PRN
Status: DISCONTINUED | OUTPATIENT
Start: 2025-02-16 | End: 2025-02-16 | Stop reason: HOSPADM

## 2025-02-16 RX ORDER — HYDROMORPHONE HYDROCHLORIDE 1 MG/ML
0.5 INJECTION, SOLUTION INTRAMUSCULAR; INTRAVENOUS; SUBCUTANEOUS
Status: DISCONTINUED | OUTPATIENT
Start: 2025-02-16 | End: 2025-02-17 | Stop reason: HOSPADM

## 2025-02-16 RX ORDER — ONDANSETRON 4 MG/1
4 TABLET, ORALLY DISINTEGRATING ORAL EVERY 6 HOURS PRN
Status: DISCONTINUED | OUTPATIENT
Start: 2025-02-16 | End: 2025-02-17 | Stop reason: HOSPADM

## 2025-02-16 RX ORDER — DEXTROSE, SODIUM CHLORIDE, SODIUM LACTATE, POTASSIUM CHLORIDE, AND CALCIUM CHLORIDE 5; .6; .31; .03; .02 G/100ML; G/100ML; G/100ML; G/100ML; G/100ML
INJECTION, SOLUTION INTRAVENOUS CONTINUOUS
Status: DISCONTINUED | OUTPATIENT
Start: 2025-02-16 | End: 2025-02-17 | Stop reason: HOSPADM

## 2025-02-16 RX ORDER — CALCIUM CARBONATE 500 MG/1
1000 TABLET, CHEWABLE ORAL 4 TIMES DAILY PRN
Status: DISCONTINUED | OUTPATIENT
Start: 2025-02-16 | End: 2025-02-17 | Stop reason: HOSPADM

## 2025-02-16 RX ORDER — DEXTROAMPHETAMINE SACCHARATE, AMPHETAMINE ASPARTATE, DEXTROAMPHETAMINE SULFATE AND AMPHETAMINE SULFATE 1.25; 1.25; 1.25; 1.25 MG/1; MG/1; MG/1; MG/1
10 TABLET ORAL DAILY
Status: DISCONTINUED | OUTPATIENT
Start: 2025-02-16 | End: 2025-02-16

## 2025-02-16 RX ORDER — OXYCODONE HYDROCHLORIDE 5 MG/1
5 TABLET ORAL EVERY 4 HOURS PRN
Status: DISCONTINUED | OUTPATIENT
Start: 2025-02-16 | End: 2025-02-17 | Stop reason: HOSPADM

## 2025-02-16 RX ORDER — NALOXONE HYDROCHLORIDE 0.4 MG/ML
0.1 INJECTION, SOLUTION INTRAMUSCULAR; INTRAVENOUS; SUBCUTANEOUS
Status: DISCONTINUED | OUTPATIENT
Start: 2025-02-16 | End: 2025-02-16 | Stop reason: HOSPADM

## 2025-02-16 RX ORDER — PROPOFOL 10 MG/ML
INJECTION, EMULSION INTRAVENOUS CONTINUOUS PRN
Status: DISCONTINUED | OUTPATIENT
Start: 2025-02-16 | End: 2025-02-16

## 2025-02-16 RX ORDER — HYDROMORPHONE HYDROCHLORIDE 1 MG/ML
0.3 INJECTION, SOLUTION INTRAMUSCULAR; INTRAVENOUS; SUBCUTANEOUS
Status: DISCONTINUED | OUTPATIENT
Start: 2025-02-16 | End: 2025-02-17 | Stop reason: HOSPADM

## 2025-02-16 RX ORDER — KETOROLAC TROMETHAMINE 30 MG/ML
INJECTION, SOLUTION INTRAMUSCULAR; INTRAVENOUS PRN
Status: DISCONTINUED | OUTPATIENT
Start: 2025-02-16 | End: 2025-02-16

## 2025-02-16 RX ORDER — AMOXICILLIN 250 MG
2 CAPSULE ORAL 2 TIMES DAILY PRN
Status: DISCONTINUED | OUTPATIENT
Start: 2025-02-16 | End: 2025-02-17 | Stop reason: HOSPADM

## 2025-02-16 RX ORDER — HYDROXYZINE HYDROCHLORIDE 25 MG/1
25 TABLET, FILM COATED ORAL EVERY 6 HOURS PRN
Status: DISCONTINUED | OUTPATIENT
Start: 2025-02-16 | End: 2025-02-16 | Stop reason: HOSPADM

## 2025-02-16 RX ORDER — CEFAZOLIN SODIUM 1 G/3ML
INJECTION, POWDER, FOR SOLUTION INTRAMUSCULAR; INTRAVENOUS PRN
Status: DISCONTINUED | OUTPATIENT
Start: 2025-02-16 | End: 2025-02-16

## 2025-02-16 RX ORDER — PROPRANOLOL HYDROCHLORIDE 10 MG/1
10 TABLET ORAL 3 TIMES DAILY
Status: DISCONTINUED | OUTPATIENT
Start: 2025-02-16 | End: 2025-02-16

## 2025-02-16 RX ORDER — ACETAMINOPHEN 325 MG/1
975 TABLET ORAL EVERY 8 HOURS
Status: DISCONTINUED | OUTPATIENT
Start: 2025-02-16 | End: 2025-02-17 | Stop reason: HOSPADM

## 2025-02-16 RX ORDER — OXYCODONE HYDROCHLORIDE 5 MG/1
10 TABLET ORAL EVERY 4 HOURS PRN
Status: DISCONTINUED | OUTPATIENT
Start: 2025-02-16 | End: 2025-02-17 | Stop reason: HOSPADM

## 2025-02-16 RX ORDER — SODIUM CHLORIDE, SODIUM LACTATE, POTASSIUM CHLORIDE, CALCIUM CHLORIDE 600; 310; 30; 20 MG/100ML; MG/100ML; MG/100ML; MG/100ML
INJECTION, SOLUTION INTRAVENOUS CONTINUOUS PRN
Status: DISCONTINUED | OUTPATIENT
Start: 2025-02-16 | End: 2025-02-16

## 2025-02-16 RX ADMIN — MEPERIDINE HYDROCHLORIDE 25 MG: 25 INJECTION, SOLUTION INTRAMUSCULAR; INTRAVENOUS; SUBCUTANEOUS at 14:08

## 2025-02-16 RX ADMIN — ONDANSETRON 4 MG: 2 INJECTION, SOLUTION INTRAMUSCULAR; INTRAVENOUS at 12:39

## 2025-02-16 RX ADMIN — PROPOFOL 200 MG: 10 INJECTION, EMULSION INTRAVENOUS at 13:55

## 2025-02-16 RX ADMIN — SENNOSIDES AND DOCUSATE SODIUM 1 TABLET: 50; 8.6 TABLET ORAL at 19:31

## 2025-02-16 RX ADMIN — SODIUM CHLORIDE 1250 MG: 0.9 INJECTION, SOLUTION INTRAVENOUS at 09:42

## 2025-02-16 RX ADMIN — DOCUSATE SODIUM 100 MG: 100 CAPSULE, LIQUID FILLED ORAL at 19:31

## 2025-02-16 RX ADMIN — PHENYLEPHRINE HYDROCHLORIDE 100 MCG: 10 INJECTION INTRAVENOUS at 14:16

## 2025-02-16 RX ADMIN — HYDROMORPHONE HYDROCHLORIDE 0.5 MG: 1 INJECTION, SOLUTION INTRAMUSCULAR; INTRAVENOUS; SUBCUTANEOUS at 11:10

## 2025-02-16 RX ADMIN — FAMOTIDINE 20 MG: 10 INJECTION INTRAVENOUS at 01:52

## 2025-02-16 RX ADMIN — HYDROMORPHONE HYDROCHLORIDE 0.5 MG: 1 INJECTION, SOLUTION INTRAMUSCULAR; INTRAVENOUS; SUBCUTANEOUS at 22:21

## 2025-02-16 RX ADMIN — KETOROLAC TROMETHAMINE 20 MG: 30 INJECTION, SOLUTION INTRAMUSCULAR at 14:50

## 2025-02-16 RX ADMIN — SODIUM CHLORIDE, SODIUM LACTATE, POTASSIUM CHLORIDE, CALCIUM CHLORIDE AND DEXTROSE MONOHYDRATE: 5; 600; 310; 30; 20 INJECTION, SOLUTION INTRAVENOUS at 02:50

## 2025-02-16 RX ADMIN — GLYCOPYRROLATE 0.1 MG: 0.2 INJECTION, SOLUTION INTRAMUSCULAR; INTRAVENOUS at 13:47

## 2025-02-16 RX ADMIN — GLYCOPYRROLATE 0.1 MG: 0.2 INJECTION, SOLUTION INTRAMUSCULAR; INTRAVENOUS at 13:55

## 2025-02-16 RX ADMIN — DEXAMETHASONE SODIUM PHOSPHATE 4 MG: 4 INJECTION, SOLUTION INTRA-ARTICULAR; INTRALESIONAL; INTRAMUSCULAR; INTRAVENOUS; SOFT TISSUE at 13:47

## 2025-02-16 RX ADMIN — ACETAMINOPHEN 975 MG: 325 TABLET, FILM COATED ORAL at 17:21

## 2025-02-16 RX ADMIN — MIDAZOLAM 2 MG: 1 INJECTION INTRAMUSCULAR; INTRAVENOUS at 13:47

## 2025-02-16 RX ADMIN — CEFAZOLIN 2 G: 1 INJECTION, POWDER, FOR SOLUTION INTRAMUSCULAR; INTRAVENOUS at 13:47

## 2025-02-16 RX ADMIN — FENTANYL CITRATE 100 MCG: 50 INJECTION INTRAMUSCULAR; INTRAVENOUS at 13:55

## 2025-02-16 RX ADMIN — FAMOTIDINE 20 MG: 10 INJECTION INTRAVENOUS at 12:39

## 2025-02-16 RX ADMIN — HYDROMORPHONE HYDROCHLORIDE 0.5 MG: 1 INJECTION, SOLUTION INTRAMUSCULAR; INTRAVENOUS; SUBCUTANEOUS at 17:21

## 2025-02-16 RX ADMIN — PROPOFOL 75 MCG/KG/MIN: 10 INJECTION, EMULSION INTRAVENOUS at 13:56

## 2025-02-16 RX ADMIN — ONDANSETRON 4 MG: 2 INJECTION INTRAMUSCULAR; INTRAVENOUS at 14:50

## 2025-02-16 RX ADMIN — SODIUM CHLORIDE, POTASSIUM CHLORIDE, SODIUM LACTATE AND CALCIUM CHLORIDE: 600; 310; 30; 20 INJECTION, SOLUTION INTRAVENOUS at 14:04

## 2025-02-16 RX ADMIN — PIPERACILLIN AND TAZOBACTAM 3.38 G: 3; .375 INJECTION, POWDER, FOR SOLUTION INTRAVENOUS at 02:50

## 2025-02-16 RX ADMIN — HYDROMORPHONE HYDROCHLORIDE 0.5 MG: 1 INJECTION, SOLUTION INTRAMUSCULAR; INTRAVENOUS; SUBCUTANEOUS at 09:01

## 2025-02-16 RX ADMIN — DOCUSATE SODIUM 100 MG: 100 CAPSULE, LIQUID FILLED ORAL at 09:04

## 2025-02-16 RX ADMIN — PIPERACILLIN AND TAZOBACTAM 3.38 G: 3; .375 INJECTION, POWDER, FOR SOLUTION INTRAVENOUS at 09:04

## 2025-02-16 RX ADMIN — PHENYLEPHRINE HYDROCHLORIDE 100 MCG: 10 INJECTION INTRAVENOUS at 14:26

## 2025-02-16 RX ADMIN — HYDROMORPHONE HYDROCHLORIDE 0.5 MG: 1 INJECTION, SOLUTION INTRAMUSCULAR; INTRAVENOUS; SUBCUTANEOUS at 19:32

## 2025-02-16 ASSESSMENT — ACTIVITIES OF DAILY LIVING (ADL)
ADLS_ACUITY_SCORE: 15
ADLS_ACUITY_SCORE: 15
ADLS_ACUITY_SCORE: 16
ADLS_ACUITY_SCORE: 16
ADLS_ACUITY_SCORE: 21
ADLS_ACUITY_SCORE: 16
ADLS_ACUITY_SCORE: 15
ADLS_ACUITY_SCORE: 16
ADLS_ACUITY_SCORE: 16
ADLS_ACUITY_SCORE: 15
ADLS_ACUITY_SCORE: 16
ADLS_ACUITY_SCORE: 15
ADLS_ACUITY_SCORE: 21
ADLS_ACUITY_SCORE: 16
ADLS_ACUITY_SCORE: 15
ADLS_ACUITY_SCORE: 15
ADLS_ACUITY_SCORE: 16

## 2025-02-16 NOTE — MEDICATION SCRIBE - ADMISSION MEDICATION HISTORY
Medication Scribe Admission Medication History    Admission medication history is complete. The information provided in this note is only as accurate as the sources available at the time of the update.    Information Source(s): Patient via in-person    Pertinent Information: Med Rec completed with patient at bedside. Patient did use her Vagifem yesterday per her account (Was list filled 07/2024 for 3 month supply) is not using consistently. Stated she should be taking Propranolol but ran out of that also several months ago and never got it refilled.    Changes made to PTA medication list:  Added: None  Deleted: Loperamide, Propranolol 10 mg TID, Seroquel 100 mg, Seroquel 50 mg, Vitamin D  Changed: None    Allergies reviewed with patient and updates made in EHR: yes    Medication History Completed By: Charissa Gómez 2/16/2025 9:19 AM    PTA Med List   Medication Sig Last Dose/Taking    amphetamine-dextroamphetamine (ADDERALL) 10 MG tablet Take 10 mg by mouth 3 times daily. 2/15/2025 Noon    CALCIUM PO Take 1 tablet by mouth daily. Past Week    estradiol (VAGIFEM) 10 MCG TABS vaginal tablet  2/15/2025    levonorgestrel (MIRENA) 52 MG (20 mcg/day) IUD 1 each by Intrauterine route once. Placed 9/24/2024 Taking    Multiple Vitamin (MULTI-VITAMIN DAILY PO) Take 1 tablet by mouth daily. Past Week

## 2025-02-16 NOTE — ED TRIAGE NOTES
Sudden onset of abd pain,  hx of hernia repair that needs to be redone and has met with surgeon. Pt denies N/V, denies constipation/diarrhea, denies urinary symptoms. Received 150mcg fentanyl IV by EMS.      Triage Assessment (Adult)       Row Name 02/15/25 4029          Triage Assessment    Airway WDL WDL        Respiratory WDL    Respiratory WDL WDL        Skin Circulation/Temperature WDL    Skin Circulation/Temperature WDL WDL        Cardiac WDL    Cardiac WDL WDL        Peripheral/Neurovascular WDL    Peripheral Neurovascular WDL WDL        Cognitive/Neuro/Behavioral WDL    Cognitive/Neuro/Behavioral WDL WDL

## 2025-02-16 NOTE — PROGRESS NOTES
[facility]    Medicine Progress Note - Hospitalist Service    Date of Admission:  2/15/2025    Assessment & Plan     Kenya Zurita is a 50 yo female with past medical history of umbilical hernia, Harish-en-Y gastric bypass, MINNA, and MDD that presented with abdominal pain and admitted with concern for possible abdominal infection and need for hernia surgery.     # Abdominal pain   # Recurrent incarcerated umbilical hernia s/p repair 2/16   Patient has a known history of recurrent hernia and had been attempting to coordinate surgery outpatient, but with sudden onset pain she reported to the ED. Initially started on antibiotics with concern for intraabdominal infection, but white count and lactate only minimally elevated and CT reassuring besides remonstration of incarcerated hernia.   - General surgery consulted and following  - Hernia surgically repaired 2/16  - Advancing diet to regular as tolerated   - Stopped antibiotics   - Acetaminophen and oxycodone for pain     # Staph epi in a single blood culture  Suspect that this is a contaminate. Will monitor off antibiotics.     # MINNA  # MDD  - No longer taking propranolol      # ADHD  - PTA Adderall to PRN per request      # Harish-en-Y gastric bypass  Noted, continue outpatient follow up for nutritional labs and monitoring         Diet: NPO for Medical/Clinical Reasons Except for: Meds, Ice Chips  Advance Diet as Tolerated: Clear Liquid Diet    DVT Prophylaxis: Ambulate every shift  Ulloa Catheter: Not present  Lines: None     Cardiac Monitoring: None  Code Status: Full Code      Clinically Significant Risk Factors Present on Admission          # Hyperchloremia: Highest Cl = 109 mmol/L in last 2 days, will monitor as appropriate      # Hypocalcemia: Lowest Ca = 8.3 mg/dL in last 2 days, will monitor and replace as appropriate              # Anemia: based on hgb <11                Social Drivers of Health    Depression: At risk (7/11/2024)     Received [facility]    PHQ-2     PHQ-2 TOTAL SCORE: 5   Tobacco Use: High Risk (2/4/2025)    Received from [facility]    Patient History     Smoking Tobacco Use: Some Days     Smokeless Tobacco Use: Never          Disposition Plan     Medically Ready for Discharge: Anticipated Tomorrow     Jigar Pappas MD  Hospitalist Service  [facility]  Securely message with [employer] (more info)  Text page via [employer] Paging/Directory   ______________________________________________________________________    Interval History   Admitted overnight, went to surgery this morning for hernia repair. Patient reports having some continued abdominal pain post operatively. With some continued abdominal pain patient hesitant to consider discharge today, which is reasonable.     Physical Exam   Vital Signs: Temp: 98.1  F (36.7  C) Temp src: Oral BP: 116/67 Pulse: 58   Resp: 12 SpO2: 98 % O2 Device: None (Room air)    Weight: 118 lbs 9.72 oz    General Appearance: Awake and alert, laying back in bed, in no acute distress   Respiratory: Breathing easily on nasal canula   Cardiovascular: Regular rate and rhythm   GI: Wearing abdominal binder, some abdominal tenderness to palpation, no guarding   Other: Appropriate mood and affect, no focal deficits appreciated      Medical Decision Making       45 MINUTES SPENT BY ME on the date of service doing chart review, history, exam, documentation & further activities per the note.      Data     I have personally reviewed the following data over the past 24 hrs:    6.5  \   10.7 (L)   / 293     141 109 (H) 9.6 /  99   3.6 21 (L) 0.65 \     ALT: 14 AST: 21 AP: 66 TBILI: 0.3   ALB: 4.6 TOT PROTEIN: 7.7 LIPASE: 30     Trop: <6 BNP: N/A     TSH: 5.59 (H) T4: 1.38 A1C: N/A     Procal: <0.02 CRP: N/A Lactic Acid: 1.0         Imaging results reviewed over the past 24 hrs:   Recent Results (from the past 24  hours)   CT Abdomen Pelvis w Contrast    Narrative    EXAM: CT ABDOMEN PELVIS W CONTRAST  LOCATION: [facility]  DATE: 2/15/2025    INDICATION: sudden onset 10 10 pain  COMPARISON: None.  TECHNIQUE: CT scan of the abdomen and pelvis was performed following injection of IV contrast. Multiplanar reformats were obtained. Dose reduction techniques were used.  CONTRAST: 59 mL Isovue 370    FINDINGS:   LOWER CHEST: Lung bases are clear.    HEPATOBILIARY: Mild intrahepatic and extra hepatic bile duct dilatation to the level of the ampulla. The common duct is dilated to 13 mm. No stone or mass is seen.    PANCREAS: Pancreas is normal.    SPLEEN: Normal.    ADRENAL GLANDS: Normal.    KIDNEYS/BLADDER: Normal.    BOWEL: Gastrojejunostomy. Relatively large amount stool throughout the colon.    LYMPH NODES: No abnormal adenopathy.    VASCULATURE: Normal.    PELVIC ORGANS: No abnormal pelvic mass. Intrauterine device.    MUSCULOSKELETAL: Small fat-containing periumbilical hernia.      Impression    IMPRESSION:   1.  Intrauterine extra hepatic bile duct dilatation. No mass or stone is seen. Some degree of dilatation can be seen in patients postcholecystectomy. Correlation with liver function tests recommended as initial step in further evaluation of this finding.    2.  Large amount stool throughout the colon.    3.  Gastrojejunostomy.    4.  Small fat-containing periumbilical hernia.

## 2025-02-16 NOTE — ED NOTES
"[facility]   Admission Handoff    The patient is Kenya Zurita, 49 year old who arrived in the ED by AMBULANCE from home with a complaint of Abdominal Pain (Hx of hernia repair)  . The patient's current symptoms are new and during this time the symptoms have remained the same. In the ED, patient was diagnosed with   Final diagnoses:   Periumbilical abdominal pain   Lactic acidosis         Needed?: No    Allergies:  No Known Allergies    Past Medical Hx:   Past Medical History:   Diagnosis Date    Allergic rhinitis     Anemia     Anxiety     MINNA (generalized anxiety disorder)     History of alcohol abuse     Insomnia     IUD (intrauterine device) in place 09/24/2024    Needs to be removed 09/2032    Major depressive disorder, recurrent episode, moderate (H)     Pterygium eye     Severe episode of recurrent major depressive disorder, without psychotic features (H)     Sleep apnea     resolved with weight loss    Suicide attempt (H) 2001    Tinea versicolor     Vulvovaginal warts        Initial vitals were: BP: (!) 125/94  Pulse: 61  Temp: 97.9  F (36.6  C)  Resp: 18  Height: 160 cm (5' 3\")  Weight: 55.8 kg (123 lb)  SpO2: 100 %   Recent vital Signs: BP (!) 125/94   Pulse 61   Temp 97.9  F (36.6  C) (Oral)   Resp 18   Ht 1.6 m (5' 3\")   Wt 55.8 kg (123 lb)   SpO2 98%   BMI 21.79 kg/m      Elimination Status: Continent: Yes     Activity Level: Independent    Fall Status:   patient and family education    Baseline Mental status: WDL  Current Mental Status changes: at basesline    Infection present or suspected this encounter: no  Sepsis suspected: No    Isolation type: NA    Bariatric equipment needed?: No    In the ED these meds were given:   Medications   piperacillin-tazobactam (ZOSYN) 3.375 g vial to attach to  mL bag (0 g Intravenous Stopped 2/15/25 2130)   vancomycin (VANCOCIN) 1,250 mg in 0.9% NaCl 262.5 mL intermittent infusion (0 mg Intravenous Stopped " 2/15/25 2302)   iopamidol (ISOVUE-370) solution 59 mL (59 mLs Intravenous $Given 2/15/25 1911)   sodium chloride 0.9 % bag 500mL for CT scan flush use (54 mLs As instructed $Given 2/15/25 1911)   sodium chloride 0.9% BOLUS 1,000 mL (0 mLs Intravenous Stopped 2/15/25 2049)   fentaNYL (PF) (SUBLIMAZE) injection 100 mcg (100 mcg Intravenous $Given 2/15/25 2059)       Drips running?  No    Home pump  No    Current LDAs: Peripheral IV: Site LFA; Gauge 20       Results:   Labs/Imaging  Ordered and Resulted from Time of ED Arrival Up to the Time of Departure from the ED  Results for orders placed or performed during the hospital encounter of 02/15/25 (from the past 24 hours)   EKG 12-lead, tracing only   Result Value Ref Range    Systolic Blood Pressure  mmHg    Diastolic Blood Pressure  mmHg    Ventricular Rate 54 BPM    Atrial Rate 54 BPM    IN Interval 104 ms    QRS Duration 86 ms     ms    QTc 421 ms    P Axis 57 degrees    R AXIS 59 degrees    T Axis 54 degrees    Interpretation ECG       Sinus bradycardia with short IN  Nonspecific ST abnormality  Abnormal ECG  No previous ECGs available     CBC with platelets differential    Narrative    The following orders were created for panel order CBC with platelets differential.  Procedure                               Abnormality         Status                     ---------                               -----------         ------                     CBC with platelets and d...[[id number]]  Abnormal            Final result                 Please view results for these tests on the individual orders.   Comprehensive metabolic panel   Result Value Ref Range    Sodium 139 135 - 145 mmol/L    Potassium 3.9 3.4 - 5.3 mmol/L    Carbon Dioxide (CO2) 24 22 - 29 mmol/L    Anion Gap 13 7 - 15 mmol/L    Urea Nitrogen 11.0 6.0 - 20.0 mg/dL    Creatinine 0.56 0.51 - 0.95 mg/dL    GFR Estimate >90 >60 mL/min/1.73m2    Calcium 9.5 8.8 - 10.4 mg/dL    Chloride 102 98 - 107 mmol/L     Glucose 117 (H) 70 - 99 mg/dL    Alkaline Phosphatase 66 40 - 150 U/L    AST 21 0 - 45 U/L    ALT 14 0 - 50 U/L    Protein Total 7.7 6.4 - 8.3 g/dL    Albumin 4.6 3.5 - 5.2 g/dL    Bilirubin Total 0.3 <=1.2 mg/dL   Lipase   Result Value Ref Range    Lipase 62 (H) 13 - 60 U/L   Lactic acid whole blood with 1x repeat in 2 hr when >2   Result Value Ref Range    Lactic Acid, Initial 2.1 (H) 0.7 - 2.0 mmol/L   Procalcitonin   Result Value Ref Range    Procalcitonin <0.02 <0.50 ng/mL   Troponin T, High Sensitivity   Result Value Ref Range    Troponin T, High Sensitivity <6 <=14 ng/L   TSH with free T4 reflex   Result Value Ref Range    TSH 5.59 (H) 0.30 - 4.20 uIU/mL   HCG qualitative pregnancy (blood)   Result Value Ref Range    hCG Serum Qualitative Negative Negative   CBC with platelets and differential   Result Value Ref Range    WBC Count 11.1 (H) 4.0 - 11.0 10e3/uL    RBC Count 4.41 3.80 - 5.20 10e6/uL    Hemoglobin 12.9 11.7 - 15.7 g/dL    Hematocrit 39.5 35.0 - 47.0 %    MCV 90 78 - 100 fL    MCH 29.3 26.5 - 33.0 pg    MCHC 32.7 31.5 - 36.5 g/dL    RDW 13.9 10.0 - 15.0 %    Platelet Count 343 150 - 450 10e3/uL    % Neutrophils 75 %    % Lymphocytes 18 %    % Monocytes 5 %    % Eosinophils 1 %    % Basophils 0 %    % Immature Granulocytes 0 %    NRBCs per 100 WBC 0 <1 /100    Absolute Neutrophils 8.4 (H) 1.6 - 8.3 10e3/uL    Absolute Lymphocytes 2.0 0.8 - 5.3 10e3/uL    Absolute Monocytes 0.6 0.0 - 1.3 10e3/uL    Absolute Eosinophils 0.1 0.0 - 0.7 10e3/uL    Absolute Basophils 0.0 0.0 - 0.2 10e3/uL    Absolute Immature Granulocytes 0.0 <=0.4 10e3/uL    Absolute NRBCs 0.0 10e3/uL   T4 free   Result Value Ref Range    Free T4 1.38 0.90 - 1.70 ng/dL   CT Abdomen Pelvis w Contrast    Narrative    EXAM: CT ABDOMEN PELVIS W CONTRAST  LOCATION: [facility]  DATE: 2/15/2025    INDICATION: sudden onset 10 10 pain  COMPARISON: None.  TECHNIQUE: CT scan of the abdomen and pelvis was performed  following injection of IV contrast. Multiplanar reformats were obtained. Dose reduction techniques were used.  CONTRAST: 59 mL Isovue 370    FINDINGS:   LOWER CHEST: Lung bases are clear.    HEPATOBILIARY: Mild intrahepatic and extra hepatic bile duct dilatation to the level of the ampulla. The common duct is dilated to 13 mm. No stone or mass is seen.    PANCREAS: Pancreas is normal.    SPLEEN: Normal.    ADRENAL GLANDS: Normal.    KIDNEYS/BLADDER: Normal.    BOWEL: Gastrojejunostomy. Relatively large amount stool throughout the colon.    LYMPH NODES: No abnormal adenopathy.    VASCULATURE: Normal.    PELVIC ORGANS: No abnormal pelvic mass. Intrauterine device.    MUSCULOSKELETAL: Small fat-containing periumbilical hernia.      Impression    IMPRESSION:   1.  Intrauterine extra hepatic bile duct dilatation. No mass or stone is seen. Some degree of dilatation can be seen in patients postcholecystectomy. Correlation with liver function tests recommended as initial step in further evaluation of this finding.    2.  Large amount stool throughout the colon.    3.  Gastrojejunostomy.    4.  Small fat-containing periumbilical hernia.   Lactic acid whole blood   Result Value Ref Range    Lactic Acid 1.0 0.7 - 2.0 mmol/L   UA with Microscopic reflex to Culture    Specimen: Urine, Clean Catch   Result Value Ref Range    Color Urine Straw Colorless, Straw, Light Yellow, Yellow    Appearance Urine Clear Clear    Glucose Urine Negative Negative mg/dL    Bilirubin Urine Negative Negative    Ketones Urine 20 (A) Negative mg/dL    Specific Gravity Urine 1.010 1.003 - 1.035    Blood Urine Negative Negative    pH Urine 5.5 5.0 - 7.0    Protein Albumin Urine Negative Negative mg/dL    Urobilinogen Urine Normal Normal, 2.0 mg/dL    Nitrite Urine Negative Negative    Leukocyte Esterase Urine Negative Negative    RBC Urine <1 <=2 /HPF    WBC Urine 3 <=5 /HPF    Squamous Epithelials Urine <1 <=1 /HPF    Narrative    Urine Culture not  indicated       For the majority of the shift this patient's behavior was Green     Cardiac Rhythm: N/A  Pt needs tele? No  Skin/wound Issues: None    Code Status: Full Code    Pain control: fair    Nausea control: pt had none    Abnormal labs/tests/findings requiring intervention: Hernia on CT causing pain?    Patient tested for COVID 19 prior to admission: NO     OBS brochure/video discussed/provided to patient/family: N/A     Family present during ED course? No     Family Comments/Social Situation comments:     Tasks needing completion: None    Matias Malagon RN

## 2025-02-16 NOTE — ANESTHESIA PREPROCEDURE EVALUATION
Anesthesia Pre-Procedure Evaluation    Patient: Kenya Zurita   MRN: [id number] : 1975        Procedure : Procedure(s):  HERNIORRHAPHY, UMBILICAL, OPEN          Past Medical History:   Diagnosis Date     Allergic rhinitis      Anemia      Anxiety      MINNA (generalized anxiety disorder)      History of alcohol abuse      Insomnia      IUD (intrauterine device) in place 2024    Needs to be removed 2032     Major depressive disorder, recurrent episode, moderate (H)      Pterygium eye      Severe episode of recurrent major depressive disorder, without psychotic features (H)      Sleep apnea     resolved with weight loss     Suicide attempt (H)      Tinea versicolor      Vulvovaginal warts       Past Surgical History:   Procedure Laterality Date     ARTHROSCOPY TEMPOROMANDIBULAR JOINT (TMJ) BILATERAL Bilateral 2024    Procedure: bilateral temporomandibular joint arthroscopy with lysis and lavage;  Surgeon: Kj Muse DDS;  Location: [facility] OR     GALLBLADDER SURGERY  2004     GYN SURGERY      Colposcopy     HERNIA REPAIR  2014    with Tummy tuck     LAPAROSCOPIC CHOLECYSTECTOMY  2004     LASIK       PANNICULECTOMY       REVISION ASHLEIGH-EN-Y  2009     TEMPOROMANDIBULAR JOINT ARTHROPLASTY       WISDOM TOOTH EXTRACTION       ZZC BREAST AUGMENTATION Bilateral       No Known Allergies   Social History     Tobacco Use     Smoking status: Never     Smokeless tobacco: Never   Substance Use Topics     Alcohol use: Not Currently      Wt Readings from Last 1 Encounters:   25 53.8 kg (118 lb 9.7 oz)        Anesthesia Evaluation   Pt has had prior anesthetic. Type: General and MAC.    No history of anesthetic complications       ROS/MED HX  ENT/Pulmonary: Comment: H/o TMJ    (+) sleep apnea,          allergic rhinitis,                             Neurologic:  - neg neurologic ROS     Cardiovascular:  - neg cardiovascular ROS   (+)  - -   -  - -                                  "Previous cardiac testing   Echo: Date: Results:    Stress Test:  Date: Results:    ECG Reviewed:  Date: 2- Results:    Sinus bradycardia with short NJ  Nonspecific ST abnormality  Abnormal ECG      Cath:  Date: Results:      METS/Exercise Tolerance:     Hematologic:     (+)      anemia,          Musculoskeletal:  - neg musculoskeletal ROS     GI/Hepatic: Comment: H/o gastric bypass      Renal/Genitourinary:  - neg Renal ROS     Endo:  - neg endo ROS     Psychiatric/Substance Use:     (+) psychiatric history other (comment), anxiety and depression alcohol abuse      Infectious Disease:  - neg infectious disease ROS     Malignancy:  - neg malignancy ROS     Other:  - neg other ROS   (-) Any chance pregnant     Physical Exam    Airway  airway exam normal           Respiratory Devices and Support         Dental       (+) Minor Abnormalities - some fillings, tiny chips      Cardiovascular   cardiovascular exam normal          Pulmonary   pulmonary exam normal            OUTSIDE LABS:  CBC:   Lab Results   Component Value Date    WBC 6.5 02/16/2025    WBC 11.1 (H) 02/15/2025    HGB 10.7 (L) 02/16/2025    HGB 12.9 02/15/2025    HCT 33.8 (L) 02/16/2025    HCT 39.5 02/15/2025     02/16/2025     02/15/2025     BMP:   Lab Results   Component Value Date     02/16/2025     02/15/2025    POTASSIUM 3.6 02/16/2025    POTASSIUM 3.9 02/15/2025    CHLORIDE 109 (H) 02/16/2025    CHLORIDE 102 02/15/2025    CO2 21 (L) 02/16/2025    CO2 24 02/15/2025    BUN 9.6 02/16/2025    BUN 11.0 02/15/2025    CR 0.65 02/16/2025    CR 0.56 02/15/2025    GLC 99 02/16/2025     (H) 02/15/2025     COAGS: No results found for: \"PTT\", \"INR\", \"FIBR\"  POC:   Lab Results   Component Value Date    HCG Negative 09/24/2024    HCGS Negative 02/15/2025     HEPATIC:   Lab Results   Component Value Date    ALBUMIN 4.6 02/15/2025    PROTTOTAL 7.7 02/15/2025    ALT 14 02/15/2025    AST 21 02/15/2025    ALKPHOS 66 02/15/2025    " BILITOTAL 0.3 02/15/2025     OTHER:   Lab Results   Component Value Date    LACT 1.0 02/15/2025    LOUIE 8.3 (L) 02/16/2025    LIPASE 30 02/16/2025    TSH 5.59 (H) 02/15/2025    T4 1.38 02/15/2025       Anesthesia Plan    ASA Status:  2    NPO Status:  NPO Appropriate    Anesthesia Type: General.     - Airway: LMA   Induction: Intravenous.   Maintenance: Balanced.        Consents    Anesthesia Plan(s) and associated risks, benefits, and realistic alternatives discussed. Questions answered and patient/representative(s) expressed understanding.     - Discussed: Risks, Benefits and Alternatives for BOTH SEDATION and the PROCEDURE were discussed     - Discussed with:  Patient            Postoperative Care    Pain management: IV analgesics, Oral pain medications.   PONV prophylaxis: Ondansetron (or other 5HT-3), Dexamethasone or Solumedrol, Background Propofol Infusion     Comments:             CAR Blankenship CRNA    I have reviewed the pertinent notes and labs in the chart from the past 30 days and (re)examined the patient.  Any updates or changes from those notes are reflected in this note.    Clinically Significant Risk Factors Present on Admission          # Hyperchloremia: Highest Cl = 109 mmol/L in last 2 days, will monitor as appropriate      # Hypocalcemia: Lowest Ca = 8.3 mg/dL in last 2 days, will monitor and replace as appropriate              # Anemia: based on hgb <11

## 2025-02-16 NOTE — ANESTHESIA PROCEDURE NOTES
Airway       Patient location during procedure: OR  Staff -        CRNA: Zulema Fall APRN CRNA       Performed By: CRNA  Consent for Airway        Urgency: elective  Indications and Patient Condition       Indications for airway management: nimco-procedural       Induction type:intravenous       Mask difficulty assessment: 1 - vent by mask    Final Airway Details       Final airway type: supraglottic airway    Supraglottic Airway Details        Type: LMA       Brand: Air-Q       LMA size: 4    Post intubation assessment        Placement verified by: capnometry, equal breath sounds and chest rise        Number of attempts at approach: 1       Number of other approaches attempted: 0       Secured with: tape and commercial tube tolbert       Ease of procedure: easy       Dentition: Intact and Unchanged

## 2025-02-16 NOTE — PROGRESS NOTES
"[facility] ADMISSION NOTE    Patient admitted to room 1008 at approximately 0030 via cart from emergency room. Patient was accompanied by nurse.     Verbal SBAR report received from Matias JONES prior to patient arrival.     Patient trasferred to bed via self. Patient alert and oriented X 3. Pain is controlled without any medications.  . Admission vital signs: Blood pressure (!) 125/94, pulse 61, temperature 97.9  F (36.6  C), temperature source Oral, resp. rate 18, height 1.6 m (5' 3\"), weight 55.8 kg (123 lb), SpO2 98%. Patient was oriented to plan of care, call light, bed controls, tv, telephone, bathroom, and visiting hours.     Risk Assessment    The following safety risks were identified during admission: none. Yellow risk band applied: NO.     Skin Initial Assessment    This writer admitted this patient and completed a full skin assessment and Enio score in the Adult PCS flowsheet.   Photo documentation of skin problem and/or wound competed via [employer] application (located under Media):  Yes    Appropriate interventions initiated as needed.     Enio Risk Assessment  Sensory Perception: 4-->no impairment  Moisture: 4-->rarely moist  Activity: 3-->walks occasionally  Mobility: 4-->no limitation  Nutrition: 3-->adequate  Friction and Shear: 3-->no apparent problem  Enio Score: 21  Mattress: Low air loss (BRIAN pump, Isolibrium, Skin Guard, Pulsate, Isoair, etc.)  Bed Frame: Standard width and length    Education    Patient has a [place] to Observation order: No  Observation education completed and documented: N/A      Armen Cope RN      "

## 2025-02-16 NOTE — CONSULTS
"Pharmacy Vancomycin Initial Note  Date of Service February 15, 2025  Patient's  1975  49 year old, female    Indication: Intra-abdominal infection    Current estimated CrCl = Estimated Creatinine Clearance: 109.4 mL/min (based on SCr of 0.56 mg/dL).    Creatinine for last 3 days  2/15/2025:  6:36 PM Creatinine 0.56 mg/dL    Recent Vancomycin Level(s) for last 3 days  No results found for requested labs within last 3 days.      Vancomycin IV Administrations (past 72 hours)        No vancomycin orders with administrations in past 72 hours.                    Nephrotoxins and other renal medications (From now, onward)      Start     Dose/Rate Route Frequency Ordered Stop    02/15/25 1930  piperacillin-tazobactam (ZOSYN) 3.375 g vial to attach to  mL bag        Note to Pharmacy: For [facility], [facility] and [facility]: For Zosyn-naive patients, use the \"Zosyn initial dose + extended infusion\" order panel.    3.375 g  over 30 Minutes Intravenous EVERY 6 HOURS 02/15/25 1858      02/15/25 1930  vancomycin (VANCOCIN) 1,250 mg in 0.9% NaCl 262.5 mL intermittent infusion         1,250 mg  over 90 Minutes Intravenous EVERY 12 HOURS 02/15/25 1925              Contrast Orders - past 72 hours (72h ago, onward)      Start     Dose/Rate Route Frequency Stop    02/15/25 1840  iopamidol (ISOVUE-370) solution 59 mL         59 mL Intravenous ONCE 02/15/25 1911            Insight[employer] Prediction of Planned Initial Vancomycin Regimen  Loading dose: N/A  Regimen: 1250 mg IV every 12 hours.  Start time: 19:24 on 02/15/2025  Exposure target: AUC24 (range)400-600 mg/L.hr   AUC24,ss: 594 mg/L.hr  Probability of AUC24 > 400: 86 %  Ctrough,ss: 16.9 mg/L  Probability of Ctrough,ss > 20: 37 %  Probability of nephrotoxicity (Lodise FRANCES ): 13 %          Plan:  Start vancomycin  1250 mg IV q12h.   Vancomycin monitoring method: AUC  Vancomycin therapeutic monitoring goal: 400-600 mg*h/L  Pharmacy will check vancomycin levels as appropriate in 1-3 Days.  "   Serum creatinine levels will be ordered daily for the first week of therapy and at least twice weekly for subsequent weeks.      Kathryn Kearney RPH

## 2025-02-16 NOTE — PLAN OF CARE
Pt A & O x 4 pain 5-8/10 in abdominal area, medications given per MAR and effective, ORA, able to make needs known, PIV infusing antibiotics intermittent with NPO for surgical hernia repair, steady gait SBA due to IV and Pain medication, pleasant in mood, uses call light,    Addendum: Went to give pt pepcid, pt then report nausea, zofran given along with new set of vitals, relief with zofran per pt report.   Problem: Pain Acute  Goal: Optimal Pain Control and Function  Outcome: Progressing     Problem: Infection  Goal: Absence of Infection Signs and Symptoms  Outcome: Progressing     Problem: Oral Intake Inadequate  Goal: Improved Oral Intake  Outcome: Unable to Meet due to NPO   Goal Outcome Evaluation:

## 2025-02-16 NOTE — ANESTHESIA POSTPROCEDURE EVALUATION
Patient: Kenya Zurita    Procedure: Procedure(s):  HERNIORRHAPHY, UMBILICAL, OPEN       Anesthesia Type:  General    Note:  Disposition: Inpatient   Postop Pain Control: Uneventful            Sign Out: Well controlled pain   PONV: No   Neuro/Psych: Uneventful            Sign Out: Acceptable/Baseline neuro status   Airway/Respiratory: Uneventful            Sign Out: Acceptable/Baseline resp. status   CV/Hemodynamics: Uneventful            Sign Out: Acceptable CV status; No obvious hypovolemia; No obvious fluid overload   Other NRE: NONE   DID A NON-ROUTINE EVENT OCCUR? No       Last vitals:  Vitals Value Taken Time   /69 02/16/25 1600   Temp 36.8  C (98.2  F) 02/16/25 1600   Pulse     Resp 16 02/16/25 1600   SpO2 96 % 02/16/25 1545       Electronically Signed By: CAR Blankenship CRNA  February 16, 2025  4:16 PM

## 2025-02-16 NOTE — ED NOTES
Patient difficult lab draw. Lab able to obtain 1 set of blood cultures. Reviewed with Dr. uHrst. Would like IVF infused and then to draw second set of blood cultures prior to starting antibiotics.

## 2025-02-16 NOTE — CONSULTS
Surgical Consultation/History and Physical  [facility] General Surgery    Kenya is seen in consultation for abdominal pain, at the request of Dr. Faviola Hurst    Chief Complaint:  abdominal pain    History of Present Illness: Kenya Zurita is a 49 year old female with past medical history of anxiety, alcohol use, prior gastric bypass, panniculectomy, primary umbilical hernia repair who presented to the emergency department yesterday with acute onset abdominal pain.  She states that she has a known umbilical hernia and has been planning on having this evaluated by a general surgeon for repair.  This hernia is uncomfortable at times, especially when she is at her job where she works as a .  Yesterday, the hernia became acutely more tender while sitting and prompted her to seek care in the emergency department.  The hernia has been reducible in the past, but this was not able to be reduced in the emergency department.  No associated nausea or emesis.  Denies any fevers or chills.  She was admitted for pain control and at this time she states she feels much more comfortable however continues to have pain at the hernia site which is still not reducible.    Patient Active Problem List   Diagnosis    Depression    Anxiety    Obstructive sleep apnea (adult) (pediatric)    History of Harish-en-Y gastric bypass    Other and unspecified postsurgical nonabsorption    Allergic rhinitis    MINNA (generalized anxiety disorder)    History of alcohol abuse    History of gastric bypass    Insomnia    LGSIL on Pap smear of cervix    Major depressive disorder, recurrent, moderate (H)    Presence of intrauterine contraceptive device (IUD)    Pterygium eye    Lactic acidosis    Periumbilical abdominal pain       Past Medical History:   Diagnosis Date    Allergic rhinitis     Anemia     Anxiety     MINNA (generalized anxiety disorder)     History of alcohol abuse     Insomnia     IUD (intrauterine device) in place 09/24/2024  "   Needs to be removed 09/2032    Major depressive disorder, recurrent episode, moderate (H)     Pterygium eye     Severe episode of recurrent major depressive disorder, without psychotic features (H)     Sleep apnea     resolved with weight loss    Suicide attempt (H) 2001    Tinea versicolor     Vulvovaginal warts        Past Surgical History:   Procedure Laterality Date    ARTHROSCOPY TEMPOROMANDIBULAR JOINT (TMJ) BILATERAL Bilateral 5/9/2024    Procedure: bilateral temporomandibular joint arthroscopy with lysis and lavage;  Surgeon: Kj Muse DDS;  Location: [facility] OR    GALLBLADDER SURGERY  04/2004    GYN SURGERY      Colposcopy    HERNIA REPAIR  11/2014    with Tummy tuck    LAPAROSCOPIC CHOLECYSTECTOMY  01/01/2004    LASIK      PANNICULECTOMY      REVISION ASHLEIGH-EN-Y  09/01/2009    TEMPOROMANDIBULAR JOINT ARTHROPLASTY      WISDOM TOOTH EXTRACTION      ZZC BREAST AUGMENTATION Bilateral        Family History   Problem Relation Age of Onset    Other Cancer Mother         [place]    Depression Mother     Other Cancer Father         Skin    Colon Cancer Maternal Grandfather     Other Cancer Paternal Grandfather         Brain cancer    Mental Illness Brother     Bipolar Disorder Brother     Mental Illness Sister     Depression Sister     Asthma Son     Uterine Cancer Mother     Obesity Mother     Hyperlipidemia Mother     Diabetes Father     Bipolar Disorder Brother     Alcoholism Brother        Social History     Tobacco Use    Smoking status: Never    Smokeless tobacco: Never   Substance Use Topics    Alcohol use: Not Currently        History   Drug Use    Types: Marijuana     Comment: NOT IN THE LAST 24 HOURS       No current outpatient medications on file.       No Known Allergies    Review of Systems:   10 point ROS negative other than what was listed in HPI    Physical Exam:  /64 (BP Location: Left arm)   Pulse 68   Temp 98  F (36.7  C) (Oral)   Resp 16   Ht 1.6 m (5' 3\")   Wt 53.8 kg (118 " lb 9.7 oz)   SpO2 100%   BMI 21.01 kg/m      Constitutional- No acute distress, well nourished, non-toxic  Eyes: Anicteric, no injection.  PERRL  ENT:  Normocephalic, atraumatic, Nose midline, moist mucus membranes  Neck - supple, no LAD  Respiratory- Nonlabored breathing on room air  Cardiovascular - Extremities warm and well perfused.  Abdomen - Soft, flat, nondistended, non-reducible umbilical hernia present which is tender to palpation. No overlying skin changes.   Neuro - No focal neuro deficits, Alert and oriented x 3  Psych: Appropriate mood and affect  Musculoskeletal: Normal gait, symmetric strength.  FROM upper and lower extremities.  Skin: Warm, Dry    CBC RESULTS:   Recent Labs   Lab Test 02/16/25  0616   WBC 6.5   RBC 3.73*   HGB 10.7*   HCT 33.8*   MCV 91   MCH 28.7   MCHC 31.7   RDW 14.2        Last Comprehensive Metabolic Panel:  Lab Results   Component Value Date     02/16/2025    POTASSIUM 3.6 02/16/2025    CHLORIDE 109 (H) 02/16/2025    CO2 21 (L) 02/16/2025    ANIONGAP 11 02/16/2025    GLC 99 02/16/2025    BUN 9.6 02/16/2025    CR 0.65 02/16/2025    GFRESTIMATED >90 02/16/2025    LOUIE 8.3 (L) 02/16/2025       CT abdomen/pelvis 2/15/25  FINDINGS:   LOWER CHEST: Lung bases are clear.     HEPATOBILIARY: Mild intrahepatic and extra hepatic bile duct dilatation to the level of the ampulla. The common duct is dilated to 13 mm. No stone or mass is seen.     PANCREAS: Pancreas is normal.     SPLEEN: Normal.     ADRENAL GLANDS: Normal.     KIDNEYS/BLADDER: Normal.     BOWEL: Gastrojejunostomy. Relatively large amount stool throughout the colon.     LYMPH NODES: No abnormal adenopathy.     VASCULATURE: Normal.     PELVIC ORGANS: No abnormal pelvic mass. Intrauterine device.     MUSCULOSKELETAL: Small fat-containing periumbilical hernia.                                                                      IMPRESSION:   1.  Intrauterine extra hepatic bile duct dilatation. No mass or stone is  seen. Some degree of dilatation can be seen in patients postcholecystectomy. Correlation with liver function tests recommended as initial step in further evaluation of this finding.     2.  Large amount stool throughout the colon.     3.  Gastrojejunostomy.     4.  Small fat-containing periumbilical hernia.    Assessment:  Kenya Zurita presents with an symptomatic recurrent incarcerated umbilical hernia containing fat.  This is not able to be reduced at bedside.  Although no bowel is present within the hernia based on CT, she continues to have significant pain at the area and I recommend proceeding with an open umbilical hernia repair.  As she is very thin, I recommend proceeding with an open repair - I am concerned there may not be adequate working space to proceed with a minimally invasive approach.      The indications, risks, benefits and alternatives to surgery were discussed in detail. The patient understood the counseling offered and wishes to proceed as planned and outlined. Risks specifically discussed include bleeding, infection, seroma, need for additional treatment, nontherapeutic intervention, recurrent hernia, potential need for mesh, potential need for temporary surgical drain, wound complication (such as dehiscence), and rare complications related to surgery and/or anesthesia such as venous thromboembolism and cardiorespiratory complications. In general, additionally we recommend avoiding long-term opioid medication for non-cancer pain due to well described risks.        Plan:   1. Plan for open recurrent umbilical hernia repair today  2. NPO, IV fluids        Vj Carlton MD on 2/16/2025 at 10:46 AM

## 2025-02-16 NOTE — PROGRESS NOTES
Reviewing chart due to high probability of transfer to Med/Surg unit.     Mushtaq Zaman RN on 2/16/2025 at 11:04 AM

## 2025-02-16 NOTE — ANESTHESIA CARE TRANSFER NOTE
Patient: Kenya Zurita    Procedure: Procedure(s):  HERNIORRHAPHY, UMBILICAL, OPEN       Diagnosis: Umbilical hernia without obstruction and without gangrene [K42.9]  Recurrent umbilical hernia [K42.9]  Diagnosis Additional Information: No value filed.    Anesthesia Type:   General     Note:    Oropharynx: oropharynx clear of all foreign objects and spontaneously breathing  Level of Consciousness: awake and drowsy      Independent Airway: airway patency satisfactory and stable  Dentition: dentition unchanged  Vital Signs Stable: post-procedure vital signs reviewed and stable  Report to RN Given: handoff report given  Patient transferred to: PACU    Handoff Report: Identifed the Patient, Identified the Reponsible Provider, Reviewed the pertinent medical history, Discussed the surgical course, Reviewed Intra-OP anesthesia mangement and issues during anesthesia, Set expectations for post-procedure period and Allowed opportunity for questions and acknowledgement of understanding  Vitals:  Vitals Value Taken Time   /67 02/16/25 1519   Temp 36.7  C (98  F) 02/16/25 1519   Pulse     Resp 12 02/16/25 1519   SpO2 95 % 02/16/25 1519       Electronically Signed By: CAR Blankenship CRNA  February 16, 2025  3:26 PM

## 2025-02-16 NOTE — PLAN OF CARE
Pt alert and oriented x4. Pleasant and cooperative with staff. Up with SBA to manage lines. Abd pain 4/10 not needing PRN medications. Slept well throughout night. NPO with ice chips. Surgical consult for today. Pt in agreement with plan. Makes needs known.

## 2025-02-16 NOTE — ED NOTES
Left arm IVs infiltrated with vanco. Hot packs applied with resolution of pain. Currently sleeping.

## 2025-02-16 NOTE — H&P
HOSPITALIST TELEMED ADMISSION H&P    Service Date : 2/16/2025  Dr. Marianna GALLEGOS am located in [place], [place]  Kenya Zurita is located in [place] at [facility]     Armen JONES  on Med/Surg unit is assisting me today with this patient.    chief complaint:   Abdominal pain.    History of Present Illness:  Kenya Zurita is a 49 year old female   With history of anxiety, alcohol abuse, gastric bypass, and umbilical hernia repair, that she states has to   Have a surgical revision of the hernia because there is a new area of the hernia popping out.  Patient said that this is a Workmen's Comp. Claim and that she is waiting on Workmen's Comp. To approve the surgery.  The patient works as a  and initially developed this abdominal hernia  in 2014.  She said initially when it developed she was able to push the bulging area back in, however the hernia became larger and she had to have surgery.  In 2018 she had the first hernia repair. The patient was out at a facility watching horse races, when she developed a sudden onset of sharp stabbing pain in her abdomen.  She said that she had some associated nausea but she did not vomit.  She denied any fever, chills, chest pain or cough.        Emergency Room Course - all pertinent labs and diagnostics were reviewed.   The patient was started on vancomycin and Zosyn in the ED  for possible intra-abdominal infection.        EKG is remarkable for sinus bradycardia with a rate of 54, nonspecific ST-T wave abnormality but no acute ST elevations or ST depressions were noted    Review of the CT scan of the abdomen and pelvis with contrast is remarkable for  the findings of intrauterine extra hepatic bile duct dilatation. No mass or stone is seen. Some degree of dilatation can be seen in patients Correlation with liver function tests recommended as initial step in further evaluation of this finding.        The patient's complete metabolic panel  was essentially normal however there was a slightly elevated serum glucose of 117. This level falls within the range of  impaired fasting glucose.  And this is often a precursor to diabetes.    The lipase is slightly elevated and this could be seen in acute pancreatitis.  The elevated serum glucose level could be due to the stress response from the pancreatitis.  The lactic acid level may be elevated due to tissue hypoperfusion associated with pancreatitis.  The lactic acid was 2.1 initially   And after IV fluids the repeat lactic acid was 1      The patient had a slightly elevated lipase of 62, the high normal is 60. This could suggest an early pancreatitis that may be starting, will repeat lipase in the morning.           Radiological diagnostics included:      Narrative & Impression   EXAM: CT ABDOMEN PELVIS W CONTRAST  LOCATION: [facility]  DATE: 2/15/2025     INDICATION: sudden onset 10 10 pain  COMPARISON: None.  TECHNIQUE: CT scan of the abdomen and pelvis was performed following injection of IV contrast. Multiplanar reformats were obtained. Dose reduction techniques were used.  CONTRAST: 59 mL Isovue 370     FINDINGS:   LOWER CHEST: Lung bases are clear.     HEPATOBILIARY: Mild intrahepatic and extra hepatic bile duct dilatation to the level of the ampulla. The common duct is dilated to 13 mm. No stone or mass is seen.     PANCREAS: Pancreas is normal.     SPLEEN: Normal.     ADRENAL GLANDS: Normal.     KIDNEYS/BLADDER: Normal.     BOWEL: Gastrojejunostomy. Relatively large amount stool throughout the colon.     LYMPH NODES: No abnormal adenopathy.     VASCULATURE: Normal.     PELVIC ORGANS: No abnormal pelvic mass. Intrauterine device.     MUSCULOSKELETAL: Small fat-containing periumbilical hernia.                                                                      IMPRESSION:   1.  Intrauterine extra hepatic bile duct dilatation. No mass or stone is seen. Some degree of  dilatation can be seen in patients postcholecystectomy. Correlation with liver function tests recommended as initial step in further evaluation of this finding.     2.  Large amount stool throughout the colon.     3.  Gastrojejunostomy.     4.  Small fat-containing periumbilical hernia.              Past Medical History  Past Medical History:   Diagnosis Date    Allergic rhinitis     Anemia     Anxiety     MINNA (generalized anxiety disorder)     History of alcohol abuse     Insomnia     IUD (intrauterine device) in place 09/24/2024    Needs to be removed 09/2032    Major depressive disorder, recurrent episode, moderate (H)     Pterygium eye     Severe episode of recurrent major depressive disorder, without psychotic features (H)     Sleep apnea     resolved with weight loss    Suicide attempt (H) 2001    Tinea versicolor     Vulvovaginal warts       Patient Active Problem List   Diagnosis    Depression    Anxiety    Obstructive sleep apnea (adult) (pediatric)    History of Harish-en-Y gastric bypass    Other and unspecified postsurgical nonabsorption    Allergic rhinitis    MINNA (generalized anxiety disorder)    History of alcohol abuse    History of gastric bypass    Insomnia    LGSIL on Pap smear of cervix    Major depressive disorder, recurrent, moderate (H)    Presence of intrauterine contraceptive device (IUD)    Pterygium eye    Lactic acidosis    Periumbilical abdominal pain         Past Surgical History  Past Surgical History:   Procedure Laterality Date    ARTHROSCOPY TEMPOROMANDIBULAR JOINT (TMJ) BILATERAL Bilateral 5/9/2024    Procedure: bilateral temporomandibular joint arthroscopy with lysis and lavage;  Surgeon: Kj Muse DDS;  Location: [facility] OR    GALLBLADDER SURGERY  04/2004    GYN SURGERY      Colposcopy    HERNIA REPAIR  11/2014    with Tummy tuck    LAPAROSCOPIC CHOLECYSTECTOMY  01/01/2004    LASIK      PANNICULECTOMY      REVISION HARISH-EN-Y  09/01/2009    TEMPOROMANDIBULAR JOINT  "ARTHROPLASTY      WISDOM TOOTH EXTRACTION      ZZC BREAST AUGMENTATION Bilateral       Social History  Kenya  reports that she has never smoked. She has never used smokeless tobacco. She reports that she does not currently use alcohol. She reports current drug use. Drug: Marijuana.    Family History  Kenya's family history includes Alcoholism in her brother; Asthma in her son; Bipolar Disorder in her brother and brother; Colon Cancer in her maternal grandfather; Depression in her mother and sister; Diabetes in her father; Hyperlipidemia in her mother; Mental Illness in her brother and sister; Obesity in her mother; Other Cancer in her father, mother, and paternal grandfather; Uterine Cancer in her mother.    Home Medications  Current Outpatient Medications   Medication Instructions    amphetamine-dextroamphetamine (ADDERALL) 10 MG tablet 10 mg, Oral    CALCIUM PO Oral    Cholecalciferol (VITAMIN D PO) Oral    estradiol (VAGIFEM) 10 MCG TABS vaginal tablet Insert 1 tablet into the vagina twice weekly.    levonorgestrel (MIRENA) 52 MG (20 mcg/day) IUD 1 each, Intrauterine, ONCE, Placed 9/24/2024    loperamide (IMODIUM A-D) 2 mg, Oral, 4 TIMES DAILY PRN    Multiple Vitamin (MULTI-VITAMIN DAILY PO) Oral    propranolol (INDERAL) 10 mg, Oral, 3 TIMES DAILY    QUEtiapine (SEROQUEL) 50 MG tablet TAKE 1 TABLET (50 MG) BY MOUTH AT BEDTIME MAY INCREASE TO 2 TABLETS IF NEEDED    QUEtiapine (SEROQUEL) 100 mg, Oral, AT BEDTIME       Allergies  No Known Allergies     10 pt ROS neg except as noted in HPI    Physical Exam:  I performed all aspects of the physical examination via Telemedicine associated with two way audio and video communication.    Vital Signs: Blood pressure 102/60, pulse 64, temperature 98.3  F (36.8  C), temperature source Oral, resp. rate 18, height 1.6 m (5' 3\"), weight 53.8 kg (118 lb 9.7 oz), SpO2 99%.    Physical Exam    Gen:  Well-developed, well-nourished, in no acute distress, lying semi-supine in her " hospital bed.  HEENT:  NC/AT,  EOMI,  hearing intact to voice  Resp:  No accessory muscle use, breath sounds clear; no wheezes no rales no rhonchi  Card:  No murmur, normal S1, S2   Abd:  Soft per RN exam, (+)TTP, non-distended, normoactive bowel sounds are present,  Skin: No rashes or skin breakdown.   Ext: No clubbing, cyanosis or edema was noted  Psych:  Not anxious, not agitated      DATA:    I&O: I/O last 3 completed shifts:  In: 1000 [IV Piggyback:1000]  Out: -      Labs:  I have personally reviewed the following studies:  Recent Labs   Lab 02/15/25  1836   POTASSIUM 3.9   CHLORIDE 102   CO2 24   BUN 11.0   ALBUMIN 4.6   ALKPHOS 66   AST 21   ALT 14   LIPASE 62*      Recent Labs   Lab 02/15/25  1836   WBC 11.1*   HGB 12.9   HCT 39.5      TSH 5.59*          Imaging: ER imaging reviewed      Misc  DVT prophylaxis:   Pneumatic compression device  Code Status: Full code   Cardiac Monitoring: no active cardiac telemetry  Ulloa:  No  Lines:  peripheral IV  Diet: NPO except medicine      ASSESSMENT/PLAN:  49-year-old female with acute onset of abdominal pain, in the presence of an umbilical hernia that is requiring a surgical revision, will be admitted to the hospital   For medical management and stabilization of a possible intra-abdominal infection.     The use of Adderall and propranolol may also contribute to the elevation of the lipase as well as the glucose.  Is important to consider the patient's medication history and the possibility of drug-induced pancreatitis.        This patient's current admission to an acute care setting is essential for the treatment of   Intra-abdominal infection    The patient's recovery is dependent on the following treatments of   - IV Zosyn  - IV vancomycin  -  surgical consultation  -  pain management  -  IVF  -  repeat the lipase.  -Correcting any electrolyte abnormalities  to stabilize this condition.       The patient is at risk of developing further complications of end  organ failures if not treated in an acute care setting.     I expect the patient's hospital stay to require 2 - 4 days      Our patient will be admitted under the hospitalist services and further management to be based on patient's clinical progress.       #ACTIVE PROBLEM LIST:    #MINNA  -  propranolol 10 mg 3 times daily    #ADHD  - Adderall 10mg q day    Clinically Significant Risk Factors Present on Admission                                     As the provider for the telehealth service, I attest that I introduced myself to the patient and provided my credentials. Based on review of the patient s chart and/or a discussion with members of the patient s treatment team, I determined that telemedicine via a real-time, two-way, interactive audio and video platform is an appropriate means of providing this service.     The encounter was approximately 30 minutes. The nurse was present for the duration of the encounter.    Chart reviewed,labs and available imaging reviewed,consultant notes reviewed. Previous available medical records have been reviewed  Care plan discussed with care team    I have seen and examined the patient today. Documentation for this visit was completed using a template. Everything documented was personally performed today with the necessary additions, deletions and changes made as appropriate with the diagnoses being listed in no particular order of clinical relevance.    [facility]na [facility]  Securely message with the Vocera Web Console (learn more here)  Text page via [employer] Paging/Directory

## 2025-02-16 NOTE — OP NOTE
OPERATIVE REPORT - Attending     Kenya Zurita   : 1975   Sex: female   MRN: [id number]              2025 3:12 PM       Procedures Performed  Umbilical hernia repair    Indications: Kenya Zurita is a 49 year old female who presented to the emergency department with a recurrent umbilical hernia. After a discussion of the risks and benefits of the procedure the pt elected to proceed with umbilical hernia repair.    Pre-operative Diagnosis: recurrent incarcerated umbilical hernia    Post-operative Diagnosis: same    Surgeon(s): Surgeons and Role:     * Vj Carlton MD - Primary      Anesthesia: General    Procedure Details  The patient was seen in the Holding Room. The risks, benefits, indications, potential complications, treatment options, and expected outcomes were discussed with the patient. The possibilities of reaction to medication, bleeding, infection, the need for additional procedures, failure to diagnose a condition, and creating a complication requiring transfusion or further operation were discussed with the patient. The patient concurred with the proposed plan, giving informed consent.  The site of surgery was properly noted/marked. The patient was taken to the operating room, identified as Kenya Zurita and the procedure verified as umbilical hernia repair. A Time Out was held and the above information confirmed.    The patient was placed in the supine position and general anesthetic was administered.  The abdomen was prepped and draped in standard fashion.      There was a 2 cm umbilical hernia present with no clear fascial defect. A curvilinear skin incision was fashioned below the umbilicus. Dissection through the subcutaneous fat and to the hernia sac was completed using both sharp and blunt dissection. Old prolene sutures were identified and removed from her prior hernia repair. The hernia sac was grasped and the fascial edges surrounding the sac were cleared using  electrocautery. There was preperitoneal fat in the hernia sac. The sac was transected and sent to pathology. Fascial edges were further cleared and the fascial defect measured 1 cm in greatest dimension. Due to the small size of the hernia, the defect was closed primarily using interrupted 2-0 prolene vertical mattress sutures. The wound was then irrigated thoroughly and inspected for hemostasis. Electrocautery was used to coagulate superficial bleeding vessels. The wound was then hemostatic and a 3-0 vicryl suture was used to tack the umbilicus to the mesh. Skin was closed with 4-0 vicryl in a running subcuticular manner.      Dermabond was applied to the incision, as well as a 2x2 gauze and tegaderm after dermabond had dried. Instrument, sponge, and needle counts were correct at closure and at the conclusion of the case.    Findings: recurrent umbilical hernia containing incarcerated preperitoneal fat    Estimated Blood Loss:  3 ml        Drains: None        Specimens: hernia sac    Complications:  None        Disposition: PACU        Condition: stable    Vj Carlton MD

## 2025-02-16 NOTE — ED PROVIDER NOTES
"[facility]  Emergency Department Visit Note    PATIENT:  Kenya Zurita     49 year old     female      [id number]    Chief complaint:  Chief Complaint   Patient presents with    Abdominal Pain     Hx of hernia repair        History of present illness:  Patient is a 49 year old female with a medical history significant for anxiety, history of alcohol abuse, history of gastric bypass, known umbilical hernia,  presenting for evaluation of sudden onset severe sharp abdominal pain.  Patient was sitting at running aces watching horse races when she noticed sudden onset severe pain in her mid abdomen.  This was so severe that she keeled over.  Prior to this episode she had no abdominal symptoms whatsoever.  She does have a history of hernia repair that needs to be redone.  She denies any nausea, vomiting, constipation, diarrhea, urinary symptoms.  She received 150 mcg of fentanyl by the paramedics and is feeling better.  Other meds include Adderall and she has an IUD in place.  She denies any recent blood in her urine or recent fevers or chills or shortness of breath prior to coming to the ER.  Has never had pain like this before.  She has had a history of a cholecystectomy and breast augmentation as well as panniculectomy.    Review of Systems:  As in HPI above    BP (!) 125/94   Pulse 61   Resp 18   Ht 1.6 m (5' 3\")   Wt 55.8 kg (123 lb)   SpO2 100%   BMI 21.79 kg/m      Physical Exam  Constitutional: laying in hospital bed, alert, oriented, in no apparent distress, appears uncomfortable, conversant, and answering questions appropriately  HEENT: normocephalic, atraumatic, pupils 3mm, equal, round, and reactive to light, sclerae anicteric, extraocular motions intact, and moist mucous membranes  Neck: able to fully range and no midline tenderness  Cardiovascular: regular rate and rhythm  Pulmonary: breathing comfortably on room air and lungs clear to auscultation bilaterally  Abdominal: soft, " taught skin, no specific tenderness, small hard umbilical hernia with no discoloration  Genitourinary: deferred  Extremities/MSK: no peripheral edema and no cyanosis   Skin: warm, dry and non-diaphoretic  Neurologic: moves all four extremities spontaneously and GCS 15  Psychiatric: calm, appropriate      MDM:  Patient is a 49 year old female with above history presenting for evaluation of sudden onset severe sharp abdominal pain.    Vitals reassuring and within normal limits. Exam shows soft abdomen with umbilical hernia, multiple well healed scars.    Differential diagnosis at this time includes was not limited to small bowel obstruction, incarcerated hernia, mesenteric ischemia, norovirus, constipation, perimenopause.  I am worried about incarcerated or strangulated hernia considering her known hernia in her umbilicus that is no longer reducible.    I am unsure as to what is causing sudden onset abdominal pain but I am concerned about a incarcerated hernia.  Did have a normal bowel movement this morning.    Plan for basic labs, lactic acid, EKG, troponin, procalcitonin, UA and pregnancy test.    Troponin is negative and EKG is reassuring therefore low concern for ACS.  Lactic acid is elevated.  I suppose that this is possibly related to strangulated bowel.  Awaiting results of the CT scan.  Considering undifferentiated elevated lactic acid starting treatment for abdominal sepsis considering SIRS criteria including leukocytosis and elevated lactic acid and abdominal pain.    Remainder of ED course below.    ED COURSE:  ED Course as of 02/16/25 1808   Sat Feb 15, 2025   1837 EKG 12-lead, tracing only  ECG:  - Ventricular rate 54 bpm, regular  - W[place], QRS, QT intervals normal  - Axis nofmal  - no ST segment or T wave changes concerning for acute ischemia  - Comparison to prior ECGs: no changes when compared with 2/15  - My independent interpretation: overall reassuring and within normal limits     1846 Lactic Acid(!):  2.1   1942 Difficult to obtain blood cultures, starting antibiotics prior to obtaining second cultures    1957 Has not required any medicines for her pain.   2116 Pain increasing.  No vomiting.  Reevaluated her abdomen and she still has a tense umbilical hernia that is fat-containing and about the size of 1 cm in diameter.  Provided patient with 100 mcg of fentanyl and attempted reduction x 10 minutes.  Attempted multiple different directions.  Patient was very relaxed the entire time.  No movement of the hernia.   2120 I spoke with Dr. Carlton: He recommended manual reduction and will come and see the patient.  She is now resting and receiving antibiotics for her SIRS criteria.  Again I am concerned that it is a hernia that is causing her symptoms but I am not 100 % sure so in an abundance of caution giving first dose of broad spectrum    2306 I spoke with the general surgeon on-call Dr. Carlton.  He plans to see the patient in the morning.  Patient will need to be admitted with IV antibiotics for her sepsis with elevated lactate, abdominal pain and leukocytosis.    2307 Continued IV antibiotics, surgical consult the morning.       Encounter Diagnoses:  Final diagnoses:   Periumbilical abdominal pain       Final disposition: [facility] admission    Faviola Hurst DO  EM Physician  [facility]       Faviola Hurst DO  02/16/25 0542

## 2025-02-17 VITALS
DIASTOLIC BLOOD PRESSURE: 61 MMHG | HEIGHT: 63 IN | TEMPERATURE: 97.8 F | HEART RATE: 72 BPM | WEIGHT: 118.61 LBS | SYSTOLIC BLOOD PRESSURE: 113 MMHG | OXYGEN SATURATION: 100 % | RESPIRATION RATE: 14 BRPM | BODY MASS INDEX: 21.02 KG/M2

## 2025-02-17 LAB
ANION GAP SERPL CALCULATED.3IONS-SCNC: 11 MMOL/L (ref 7–15)
BUN SERPL-MCNC: 5.3 MG/DL (ref 6–20)
CALCIUM SERPL-MCNC: 8.5 MG/DL (ref 8.8–10.4)
CHLORIDE SERPL-SCNC: 108 MMOL/L (ref 98–107)
CREAT SERPL-MCNC: 0.59 MG/DL (ref 0.51–0.95)
EGFRCR SERPLBLD CKD-EPI 2021: >90 ML/MIN/1.73M2
ERYTHROCYTE [DISTWIDTH] IN BLOOD BY AUTOMATED COUNT: 14 % (ref 10–15)
GLUCOSE SERPL-MCNC: 78 MG/DL (ref 70–99)
HCO3 SERPL-SCNC: 23 MMOL/L (ref 22–29)
HCT VFR BLD AUTO: 31.6 % (ref 35–47)
HGB BLD-MCNC: 10.2 G/DL (ref 11.7–15.7)
MCH RBC QN AUTO: 29.1 PG (ref 26.5–33)
MCHC RBC AUTO-ENTMCNC: 32.3 G/DL (ref 31.5–36.5)
MCV RBC AUTO: 90 FL (ref 78–100)
PLATELET # BLD AUTO: 283 10E3/UL (ref 150–450)
POTASSIUM SERPL-SCNC: 3.8 MMOL/L (ref 3.4–5.3)
RBC # BLD AUTO: 3.51 10E6/UL (ref 3.8–5.2)
SODIUM SERPL-SCNC: 142 MMOL/L (ref 135–145)
WBC # BLD AUTO: 8 10E3/UL (ref 4–11)

## 2025-02-17 PROCEDURE — 99239 HOSP IP/OBS DSCHRG MGMT >30: CPT | Performed by: STUDENT IN AN ORGANIZED HEALTH CARE EDUCATION/TRAINING PROGRAM

## 2025-02-17 PROCEDURE — 250N000013 HC RX MED GY IP 250 OP 250 PS 637: Performed by: INTERNAL MEDICINE

## 2025-02-17 PROCEDURE — 36415 COLL VENOUS BLD VENIPUNCTURE: CPT | Performed by: STUDENT IN AN ORGANIZED HEALTH CARE EDUCATION/TRAINING PROGRAM

## 2025-02-17 PROCEDURE — 82565 ASSAY OF CREATININE: CPT | Performed by: STUDENT IN AN ORGANIZED HEALTH CARE EDUCATION/TRAINING PROGRAM

## 2025-02-17 PROCEDURE — 85018 HEMOGLOBIN: CPT | Performed by: STUDENT IN AN ORGANIZED HEALTH CARE EDUCATION/TRAINING PROGRAM

## 2025-02-17 PROCEDURE — 250N000013 HC RX MED GY IP 250 OP 250 PS 637: Performed by: STUDENT IN AN ORGANIZED HEALTH CARE EDUCATION/TRAINING PROGRAM

## 2025-02-17 RX ORDER — ACETAMINOPHEN 325 MG/1
975 TABLET ORAL EVERY 8 HOURS
Qty: 90 TABLET | Refills: 0 | Status: SHIPPED | OUTPATIENT
Start: 2025-02-17

## 2025-02-17 RX ORDER — OXYCODONE HYDROCHLORIDE 5 MG/1
5 TABLET ORAL EVERY 4 HOURS PRN
Qty: 10 TABLET | Refills: 0 | Status: SHIPPED | OUTPATIENT
Start: 2025-02-17

## 2025-02-17 RX ORDER — POLYETHYLENE GLYCOL 3350 17 G/17G
17 POWDER, FOR SOLUTION ORAL DAILY PRN
Qty: 510 G | Refills: 0 | Status: SHIPPED | OUTPATIENT
Start: 2025-02-17

## 2025-02-17 RX ADMIN — OXYCODONE 10 MG: 5 TABLET ORAL at 12:16

## 2025-02-17 RX ADMIN — SENNOSIDES AND DOCUSATE SODIUM 1 TABLET: 50; 8.6 TABLET ORAL at 08:41

## 2025-02-17 RX ADMIN — OXYCODONE 10 MG: 5 TABLET ORAL at 08:41

## 2025-02-17 RX ADMIN — DOCUSATE SODIUM 100 MG: 100 CAPSULE, LIQUID FILLED ORAL at 08:41

## 2025-02-17 RX ADMIN — POLYETHYLENE GLYCOL 3350 17 G: 17 POWDER, FOR SOLUTION ORAL at 08:42

## 2025-02-17 RX ADMIN — ACETAMINOPHEN 975 MG: 325 TABLET, FILM COATED ORAL at 01:45

## 2025-02-17 RX ADMIN — ACETAMINOPHEN 975 MG: 325 TABLET, FILM COATED ORAL at 08:41

## 2025-02-17 RX ADMIN — OXYCODONE 10 MG: 5 TABLET ORAL at 01:48

## 2025-02-17 ASSESSMENT — ACTIVITIES OF DAILY LIVING (ADL)
ADLS_ACUITY_SCORE: 21

## 2025-02-17 NOTE — DISCHARGE SUMMARY
[facility]  Hospitalist Discharge Summary      Date of Admission:  2/15/2025  Date of Discharge:  2/17/2025  Discharging Provider: Galileo Lilly DO  Discharge Service: Hospitalist Service    Discharge Diagnoses   Recurrent incarcerated umbilical hernia s/p repair 2/16   Staph epidermis in a single blood culture (likely contaminate)   MINNA  MDD  ADHD   Harish-en-Y gastric bypass     Clinically Significant Risk Factors          Follow-ups Needed After Discharge   Follow-up Appointments       Follow-up and recommended labs and tests       Follow up with primary care provider, Physician No Ref-Primary, within 7 days for hospital follow- up.  No follow up labs or test are needed.              Unresulted Labs Ordered in the Past 30 Days of this Admission       Date and Time Order Name Status Description    2/16/2025  2:36 PM Surgical Pathology Exam In process     2/15/2025  6:47 PM Blood Culture Peripheral Blood Preliminary     2/15/2025  6:47 PM Blood Culture Arm, Right Preliminary         These results will be followed up by Surgery and PCP.     Discharge Disposition   Discharged to home  Condition at discharge: Stable    Hospital Course   Kenya Zurita is a 50 yo female with past medical history of umbilical hernia, Harish-en-Y gastric bypass, MINNA, and MDD that presented with abdominal pain and admitted with concern for possible abdominal infection and need for hernia surgery.     Recurrent incarcerated umbilical hernia s/p repair 2/16   Patient has a known history of recurrent hernia and had been attempting to coordinate surgery outpatient, but with sudden onset pain she reported to the ED. Initially started on antibiotics with concern for intraabdominal infection, but white count and lactate only minimally elevated and CT reassuring besides remonstration of incarcerated hernia.   - Follow-up with General surgery in 1 to 2 weeks  - Regular diet   - Acetaminophen and oxycodone for pain    -MiraLAX for bowel regimen    Staph epidermis in a single blood culture (likely contaminate)  Suspect that this is a contaminate. Will monitor off antibiotics.  And afebrile and without leukocytosis since admission.    MINNA  MDD  - No longer taking propranolol      ADHD  -Continue PTA Adderall     Harish-en-Y gastric bypass  Noted, continue outpatient follow up for nutritional labs and monitoring     Consultations This Hospital Stay   PHARMACY TO DOSE VANCO  SURGERY GENERAL IP CONSULT    Code Status   Full Code    Time Spent on this Encounter   I, Galileo Lilly DO, personally saw the patient today and spent greater than 30 minutes discharging this patient.       Galileo Lilly DO  [facility] SURGICAL  [address] [facility] [address]  Phone: [phone]  Fax: [phone]  ______________________________________________________________________    Physical Exam   Vital Signs: Temp: 97.7  F (36.5  C) Temp src: Oral BP: 114/71 Pulse: 50   Resp: 14 SpO2: 99 % O2 Device: None (Room air)    Weight: 118 lbs 9.72 oz    Constitutional: awake, alert, cooperative, no apparent distress, and appears stated age  Respiratory: No increased work of breathing, good air exchange, clear to auscultation bilaterally, no crackles or wheezing  Cardiovascular: Normal apical impulse, regular rate and rhythm, normal S1 and S2, no S3 or S4, and no murmur noted  GI: No scars, normal bowel sounds, soft, non-distended, non-tender, no masses palpated, no hepatosplenomegally  Skin: normal skin color, texture, turgor  Musculoskeletal: There is no redness, warmth, or swelling of the joints.  Full range of motion noted.  Motor strength is 5 out of 5 all extremities bilaterally.  Tone is normal.       Primary Care Physician   Physician No Ref-Primary    Discharge Orders      Reason for your hospital stay    Recurrent incarcerated umbilical hernia s/p repair 2/16     Follow-up and recommended labs and tests     Follow up with  primary care provider, Physician No Ref-Primary, within 7 days for hospital follow- up.  No follow up labs or test are needed.     Activity    Your activity upon discharge: activity as tolerated     Diet    Follow this diet upon discharge:   Advance Diet as Tolerated: Regular Diet Adult       Significant Results and Procedures   Most Recent 3 CBC's:  Recent Labs   Lab Test 02/17/25  0608 02/16/25  0616 02/15/25  1836   WBC 8.0 6.5 11.1*   HGB 10.2* 10.7* 12.9   MCV 90 91 90    293 343     Most Recent 3 BMP's:  Recent Labs   Lab Test 02/17/25  0608 02/16/25  0616 02/15/25  1836    141 139   POTASSIUM 3.8 3.6 3.9   CHLORIDE 108* 109* 102   CO2 23 21* 24   BUN 5.3* 9.6 11.0   CR 0.59 0.65 0.56   ANIONGAP 11 11 13   LOUIE 8.5* 8.3* 9.5   GLC 78 99 117*     Most Recent 2 LFT's:  Recent Labs   Lab Test 02/15/25  1836 09/05/21  1741   AST 21 15   ALT 14 22   ALKPHOS 66 58   BILITOTAL 0.3 0.2   ,   Results for orders placed or performed during the hospital encounter of 02/15/25   CT Abdomen Pelvis w Contrast    Narrative    EXAM: CT ABDOMEN PELVIS W CONTRAST  LOCATION: [facility]  DATE: 2/15/2025    INDICATION: sudden onset 10 10 pain  COMPARISON: None.  TECHNIQUE: CT scan of the abdomen and pelvis was performed following injection of IV contrast. Multiplanar reformats were obtained. Dose reduction techniques were used.  CONTRAST: 59 mL Isovue 370    FINDINGS:   LOWER CHEST: Lung bases are clear.    HEPATOBILIARY: Mild intrahepatic and extra hepatic bile duct dilatation to the level of the ampulla. The common duct is dilated to 13 mm. No stone or mass is seen.    PANCREAS: Pancreas is normal.    SPLEEN: Normal.    ADRENAL GLANDS: Normal.    KIDNEYS/BLADDER: Normal.    BOWEL: Gastrojejunostomy. Relatively large amount stool throughout the colon.    LYMPH NODES: No abnormal adenopathy.    VASCULATURE: Normal.    PELVIC ORGANS: No abnormal pelvic mass. Intrauterine  device.    MUSCULOSKELETAL: Small fat-containing periumbilical hernia.      Impression    IMPRESSION:   1.  Intrauterine extra hepatic bile duct dilatation. No mass or stone is seen. Some degree of dilatation can be seen in patients postcholecystectomy. Correlation with liver function tests recommended as initial step in further evaluation of this finding.    2.  Large amount stool throughout the colon.    3.  Gastrojejunostomy.    4.  Small fat-containing periumbilical hernia.       Discharge Medications   Current Discharge Medication List        START taking these medications    Details   acetaminophen (TYLENOL) 325 MG tablet Take 3 tablets (975 mg) by mouth every 8 hours.  Qty: 90 tablet, Refills: 0    Associated Diagnoses: Umbilical hernia without obstruction and without gangrene      oxyCODONE (ROXICODONE) 5 MG tablet Take 1 tablet (5 mg) by mouth every 4 hours as needed for moderate pain.  Qty: 10 tablet, Refills: 0    Associated Diagnoses: Umbilical hernia without obstruction and without gangrene      polyethylene glycol (MIRALAX) 17 GM/Dose powder Take 17 g by mouth daily as needed for constipation.  Qty: 510 g, Refills: 0    Associated Diagnoses: Umbilical hernia without obstruction and without gangrene           CONTINUE these medications which have NOT CHANGED    Details   amphetamine-dextroamphetamine (ADDERALL) 10 MG tablet Take 10 mg by mouth 3 times daily.      CALCIUM PO Take 1 tablet by mouth daily.      estradiol (VAGIFEM) 10 MCG TABS vaginal tablet       levonorgestrel (MIRENA) 52 MG (20 mcg/day) IUD 1 each by Intrauterine route once. Placed 9/24/2024      Multiple Vitamin (MULTI-VITAMIN DAILY PO) Take 1 tablet by mouth daily.           Allergies   No Known Allergies

## 2025-02-17 NOTE — PROGRESS NOTES
[facility] DISCHARGE NOTE    Patient discharged to home at 1:59 PM via wheel chair. Accompanied by son and staff. Discharge instructions reviewed with patient, opportunity offered to ask questions. Prescriptions filled and sent with patient upon discharge. All belongings sent with patient.    Manisha Olivares RN

## 2025-02-17 NOTE — PLAN OF CARE
Problem: Adult Inpatient Plan of Care  Goal: Plan of Care Review  Description: The Plan of Care Review/Shift note should be completed every shift.  The Outcome Evaluation is a brief statement about your assessment that the patient is improving, declining, or no change.  This information will be displayed automatically on your shift  note.  Flowsheets (Taken 2/17/2025 0315)  Outcome Evaluation: Pt A/Ox4, Pt independent in room, Pt continue to complain of abdominal pain, PRN pain medication given, see mar. pt's IV SL. Will continue with plan of care, and will monitor pt throughout shift.  Plan of Care Reviewed With: patient  Overall Patient Progress: no change  Goal: Absence of Hospital-Acquired Illness or Injury  Intervention: Identify and Manage Fall Risk  Recent Flowsheet Documentation  Taken 2/17/2025 0145 by Shila Miner RN  Safety Promotion/Fall Prevention:   assistive device/personal items within reach   nonskid shoes/slippers when out of bed   patient and family education   clutter free environment maintained   safety round/check completed  Intervention: Prevent Skin Injury  Recent Flowsheet Documentation  Taken 2/17/2025 0145 by Shila Miner RN  Body Position: position changed independently  Intervention: Prevent and Manage VTE (Venous Thromboembolism) Risk  Recent Flowsheet Documentation  Taken 2/17/2025 0145 by Shila Miner RN  VTE Prevention/Management: SCDs on (sequential compression devices)  Goal: Optimal Comfort and Wellbeing  Intervention: Monitor Pain and Promote Comfort  Recent Flowsheet Documentation  Taken 2/17/2025 0145 by Shila Miner RN  Pain Management Interventions:   medication (see MAR)   care clustered   emotional support     Problem: Pain Acute  Goal: Optimal Pain Control and Function  Intervention: Develop Pain Management Plan  Recent Flowsheet Documentation  Taken 2/17/2025 0145 by Shila Miner RN  Pain Management Interventions:   medication (see MAR)   care  "clustered   emotional support     Problem: Risk for Delirium  Goal: Improved Behavioral Control  Intervention: Minimize Safety Risk  Recent Flowsheet Documentation  Taken 2/17/2025 0145 by Shila Miner RN  Enhanced Safety Measures:   pain management   Pre/Post Op education on fall prevention   review medications for side effects with activity   Goal Outcome Evaluation:      Plan of Care Reviewed With: patient    Overall Patient Progress: no changeOverall Patient Progress: no change    Outcome Evaluation: Pt A/Ox4, Pt independent in room, Pt continue to complain of abdominal pain, PRN pain medication given, see mar. pt's IV SL. Will continue with plan of care, and will monitor pt throughout shift.  /64 (BP Location: Right arm)   Pulse 52   Temp 98.1  F (36.7  C) (Oral)   Resp 14   Ht 1.6 m (5' 3\")   Wt 53.8 kg (118 lb 9.7 oz)   SpO2 99%   BMI 21.01 kg/m    Shila Miner RN on 2/17/2025 at 3:26 AM      "

## 2025-02-17 NOTE — PROGRESS NOTES
[facility] TRANSPORT NOTE  Data:   Reason for Transport:  Lower level of care    Kenya Zurita was transported to med/surg via cart at 1630.  Patient was accompanied by Registered Nurse. Equipment used for transport: None. Family was aware of reason for transport: yes    Action:  Report: received from ROBERT Gray    Response:  Patient's condition upon return was stable.    Estefania Carreon RN

## 2025-02-17 NOTE — PLAN OF CARE
Problem: Adult Inpatient Plan of Care  Goal: Plan of Care Review  Description: The Plan of Care Review/Shift note should be completed every shift.  The Outcome Evaluation is a brief statement about your assessment that the patient is improving, declining, or no change.  This information will be displayed automatically on your shift  note.  2/16/2025 2234 by Estefania Carreon RN  Outcome: Progressing  2/16/2025 2052 by Estefania Carreon RN  Outcome: Progressing   Goal Outcome Evaluation:    A/Ox4  Activity: SBA   02: RA  IV: SL  Pain:5/10     Pain localized to abdominal incision. IV Dilaudid utilized with good relief. Calls appropriately. Urinated 1x since admission. Good oral intake.

## 2025-02-17 NOTE — PROGRESS NOTES
"GENERAL SURGERY Progress Note    Admission Date: 2/15/2025  2025         Assessment and Plan:     Kenya Zurita is a 49 year old female S/P open primary repair of umbilical hernia, 1 Day Post-Op. Recovering well    - Ok for regular diet  - Pain control with tylenol, ibuprofen, PRN oxycodone  - Ok to remove dressing tomorrow  - Ok to shower, no baths or soaking for 2 weeks  - Ok for light activities, no strenuous activity or lifting more than 20 lbs for 4 weeks  - Ok to discharge to home from general surgery team's perspective. Plan for follow up in surgery clinic in 2 weeks for wound check             Interval History:     No acute events overnight. Pain well controlled. Tolerating regular diet without nausea or emesis, no fevers or chills.                     Physical Exam:   Blood pressure 100/64, pulse 52, temperature 98.1  F (36.7  C), temperature source Oral, resp. rate 14, height 1.6 m (5' 3\"), weight 53.8 kg (118 lb 9.7 oz), SpO2 99%.  Temperature Temp  Av.1  F (36.7  C)  Min: 98  F (36.7  C)  Max: 98.3  F (36.8  C)   I/O last 3 completed shifts:  In: 1620 [I.V.:1600; IV Piggyback:20]  Out: 3 [Blood:3]    Constitutional- No acute distress, well nourished, non-toxic  Eyes: Anicteric, no injection.  PERRL  ENT:  Normocephalic, atraumatic  Neck - supple, no LAD  Respiratory- nonlabored breathing on room air  Cardiovascular - Heart RRR  Abdomen - Soft, appropriately tender, nondistended. No appreciable umbilical hernia recurrence  Neuro - No focal neuro deficits, Alert and oriented x 3  Psych: Appropriate mood and affect  Musculoskeletal: Symmetric strength.  FROM upper and lower extremities.  Skin: Warm, Dry         Data:     Recent Labs   Lab Test 25  0608 25  0616 02/15/25  1836   WBC 8.0 6.5 11.1*   HGB 10.2* 10.7* 12.9   HCT 31.6* 33.8* 39.5    293 343      Recent Labs   Lab Test 25  0608 25  0616 02/15/25  1836    141 139   POTASSIUM 3.8 3.6 3.9   CHLORIDE " 108* 109* 102   CO2 23 21* 24   BUN 5.3* 9.6 11.0   CR 0.59 0.65 0.56     Recent Labs   Lab Test 02/16/25  0616 02/15/25  1836 09/05/21  1741   BILITOTAL  --  0.3 0.2   ALT  --  14 22   AST  --  21 15   ALKPHOS  --  66 58   LIPASE 30 62*  --       No lab results found.  Recent Labs   Lab Test 02/17/25  0608 02/16/25  0616 02/15/25  1836   LOUIE 8.5* 8.3* 9.5     Recent Labs   Lab Test 02/17/25  0608 02/16/25  0616 02/15/25  2315 02/15/25  1836 08/07/24  1043 08/07/24  1038 09/05/21  1741   ANIONGAP 11 11  --  13   < >  --  4   PROTEIN  --   --  Negative  --   --  Negative  --    ALBUMIN  --   --   --  4.6  --   --  3.2*    < > = values in this interval not displayed.       Vj Carlton MD  02/17/25

## 2025-02-18 ENCOUNTER — PATIENT OUTREACH (OUTPATIENT)
Dept: CARE COORDINATION | Facility: CLINIC | Age: 50
End: 2025-02-18
Payer: COMMERCIAL

## 2025-02-18 LAB
BACTERIA BLD CULT: ABNORMAL
PATH REPORT.COMMENTS IMP SPEC: NORMAL
PATH REPORT.COMMENTS IMP SPEC: NORMAL
PATH REPORT.FINAL DX SPEC: NORMAL
PATH REPORT.GROSS SPEC: NORMAL
PATH REPORT.MICROSCOPIC SPEC OTHER STN: NORMAL
PATH REPORT.RELEVANT HX SPEC: NORMAL
PHOTO IMAGE: NORMAL

## 2025-02-18 NOTE — PROGRESS NOTES
Clinic Care Coordination Contact  Transitions of Care Outreach  Chief Complaint   Patient presents with    Clinic Care Coordination - Post Hospital       Most Recent Admission Date: 2/15/2025   Most Recent Admission Diagnosis: Lactic acidosis - E87.20  Periumbilical abdominal pain - R10.33     Most Recent Discharge Date: 2/17/2025   Most Recent Discharge Diagnosis: Periumbilical abdominal pain - R10.33  Lactic acidosis - E87.20  Umbilical hernia without obstruction and without gangrene - K42.9  Recurrent umbilical hernia - K42.9  Unspecified septicemia(038.9) (H) - A41.9  Bariatric surgery status - Z98.84  Presence of intrauterine contraceptive device - Z97.5  Acquired absence of large intestine - Z90.49     Transitions of Care Assessment    Discharge Assessment  How are you doing now that you are home?: ok  How are your symptoms? (Red Flag symptoms escalate to triage hotline per guidelines): Improved  Do you know how to contact your clinic care team if you have future questions or changes to your health status? : Yes  Does the patient have their discharge instructions? : Yes  Does the patient have questions regarding their discharge instructions? : No  Were you started on any new medications or were there changes to any of your previous medications? : Yes  Does the patient have all of their medications?: Yes  Do you have questions regarding any of your medications? : No  Do you have all of your needed medical supplies or equipment (DME)?  (i.e. oxygen tank, CPAP, cane, etc.): Yes                  Follow up Plan     Discharge Follow-Up  Discharge follow up appointment scheduled in alignment with recommended follow up timeframe or Transitions of Risk Category? (Low = within 30 days; Moderate= within 14 days; High= within 7 days): Yes    Future Appointments   Date Time Provider Department Center   3/3/2025  1:00 PM Vj Carlton MD [facility]       Outpatient Plan as outlined on AVS reviewed with patient.    For  any urgent concerns, please contact our 24 hour nurse triage line: [phone] ([phone])       MARCO Rios

## 2025-02-20 ENCOUNTER — TELEPHONE (OUTPATIENT)
Dept: SURGERY | Facility: CLINIC | Age: 50
End: 2025-02-20
Payer: COMMERCIAL

## 2025-02-20 LAB — BACTERIA BLD CULT: NORMAL

## 2025-02-20 NOTE — TELEPHONE ENCOUNTER
Pt calling to get an updated work note. She requested that limited to in office work added to note. Pt is a  and her work is requesting a letter with more specific.   Letter was updated and sent to pt's my chart.         Danielle Bonfield   [facility] [place]   [facility]  [phone]

## 2025-03-03 ENCOUNTER — OFFICE VISIT (OUTPATIENT)
Dept: SURGERY | Facility: CLINIC | Age: 50
End: 2025-03-03
Payer: COMMERCIAL

## 2025-03-03 VITALS
BODY MASS INDEX: 20.9 KG/M2 | SYSTOLIC BLOOD PRESSURE: 122 MMHG | DIASTOLIC BLOOD PRESSURE: 85 MMHG | TEMPERATURE: 98.2 F | HEART RATE: 81 BPM | WEIGHT: 118 LBS

## 2025-03-03 DIAGNOSIS — L72.3 SEBACEOUS CYST: Primary | ICD-10-CM

## 2025-03-03 DIAGNOSIS — Z09 S/P UMBILICAL HERNIA REPAIR, FOLLOW-UP EXAM: ICD-10-CM

## 2025-03-03 PROCEDURE — 3079F DIAST BP 80-89 MM HG: CPT | Performed by: STUDENT IN AN ORGANIZED HEALTH CARE EDUCATION/TRAINING PROGRAM

## 2025-03-03 PROCEDURE — 1125F AMNT PAIN NOTED PAIN PRSNT: CPT | Performed by: STUDENT IN AN ORGANIZED HEALTH CARE EDUCATION/TRAINING PROGRAM

## 2025-03-03 PROCEDURE — 99212 OFFICE O/P EST SF 10 MIN: CPT | Mod: 25 | Performed by: STUDENT IN AN ORGANIZED HEALTH CARE EDUCATION/TRAINING PROGRAM

## 2025-03-03 PROCEDURE — 3074F SYST BP LT 130 MM HG: CPT | Performed by: STUDENT IN AN ORGANIZED HEALTH CARE EDUCATION/TRAINING PROGRAM

## 2025-03-03 ASSESSMENT — PAIN SCALES - GENERAL: PAINLEVEL_OUTOF10: MODERATE PAIN (5)

## 2025-03-03 NOTE — PATIENT INSTRUCTIONS
1. Continue light duties and avoid heavy lifting for an additional 1 week  2. Return to my clinic in 1 week for wound check  3. Trial PRN tylenol, heating, and icing for residual abdominal discomfort  4. Obtain US of abdomen to assess for recurrent umbilical hernia

## 2025-03-03 NOTE — PROGRESS NOTES
Surgery Post-op Note  [facility] General Surgery  [address] [place] [address]  T: [phone]  F: [phone]    Subjective:     Kenya Zurita presents to the clinic after undergoing umbilical hernia repair and sebaceous cyst excision on 2/16/25.  Patient is here for follow up. The patient reports she has ongoing incisional pain.  Patient is currently tolerating a regular diet.  Patient denies significant nausea or vomiting. She has not tried taking much for pain but she is worried pain may be exacerbated by returning to work without restrictions. She has been working with light duties but continues to have pain in her abdomen and flanks        Objective:     Vitals:   /85 (BP Location: Right arm, Patient Position: Chair, Cuff Size: Adult Regular)   Pulse 81   Temp 98.2  F (36.8  C) (Tympanic)   Wt 53.5 kg (118 lb)   BMI 20.90 kg/m     General Appearance:    Kenya is a well-appearing thin  female who is in no acute distress.   Cardiac:    RRR, extremities warm and well perfused    Pulmonary:  Nonlabored breathing on room air   Abdomen:    Soft, flat, tender to palpation lateral to incision. No appreciable hernia recurrence   Incision: The incision is healing well. There are no signs of cellulitis or drainage.        Labs/Imaging:     None       Pathology:     Final Diagnosis   Hernia sac, excision-  -Subcutaneous tissue with Epidermal Inclusion Cyst.        Assessment:     Ms.Evon LATONYA Zurita is status post open umbilical hernia repair. She continues to have pain in her abdomen and is anxious about returning to work without restrictions      Plan:   1. Continue light duties and avoid heavy lifting for an additional 1 week  2. Return to my clinic in 1 week for wound check  3. Trial PRN tylenol, heating, and icing for residual abdominal discomfort  4. Obtain US of abdomen to assess for recurrent umbilical hernia    Vj Carlton MD on 3/3/2025 at 12:46 PM

## 2025-03-03 NOTE — NURSING NOTE
"Initial /85 (BP Location: Right arm, Patient Position: Chair, Cuff Size: Adult Regular)   Pulse 81   Temp 98.2  F (36.8  C) (Tympanic)   Wt 53.5 kg (118 lb)   BMI 20.90 kg/m   Estimated body mass index is 20.9 kg/m  as calculated from the following:    Height as of 2/16/25: 1.6 m (5' 3\").    Weight as of this encounter: 53.5 kg (118 lb). .  Nuvia Jeffery LPN    "

## 2025-03-03 NOTE — LETTER
March 3, 2025      Kenya Zurita  [id number] [id number]        To Whom It May Concern:    Kenya Zurita was seen in our clinic. She may return to work with the following: limited to light duty - lifting no greater than 20 pounds starting today. She should be excused from heavy lifting or strenuous work until she follows up with me in my clinic in 1 week      Sincerely,      Vj Carlton      Electronically signed

## (undated) DEVICE — DRSG TEGADERM 2 3/8X2 3/4" 1624W

## (undated) DEVICE — TUBING SUCTION 12"X1/4" N612

## (undated) DEVICE — RULER SURGICAL PLASTIC STRL LF CS628

## (undated) DEVICE — TUBING ARTHRO SM JOINT SET  9350

## (undated) DEVICE — SOL NACL 0.9% IRRIG 1000ML BOTTLE 07138-09

## (undated) DEVICE — BUR STRK 1.1X4.8X63.5MM 2 FLUTE WIRE PASS DC 1608-002-063

## (undated) DEVICE — SU VICRYL+ 3-0 27IN SH UND VCP416H

## (undated) DEVICE — GLOVE BIOGEL PI MICRO SZ 7.5 48575

## (undated) DEVICE — GLOVE BIOGEL PI MICRO INDICATOR UNDERGLOVE SZ 8.0 48980

## (undated) DEVICE — CONNECTOR STOPCOCK 3 WAY MALE LL MX5311L

## (undated) DEVICE — DRAPE POUCH IRR 1016

## (undated) DEVICE — GOWN XLG DISP 9545

## (undated) DEVICE — DRSG KERLIX FLUFFS X5

## (undated) DEVICE — TUBING IV EXTENSION SET 34"

## (undated) DEVICE — BLADE KNIFE SURG 11 371111

## (undated) DEVICE — SYR 03ML LL W/O NDL 309657

## (undated) DEVICE — GOWN LG DISP 9515

## (undated) DEVICE — SYR BULB IRRIG DOVER 60 ML LATEX FREE 67000

## (undated) DEVICE — DECANTER BAG 2002S

## (undated) DEVICE — SYR 50ML LL W/O NDL 309653

## (undated) DEVICE — DECANTER VIAL 2006S

## (undated) DEVICE — DRSG GAUZE 2X2" 8042

## (undated) DEVICE — PREP CHLORAPREP 26ML TINTED ORANGE  260815

## (undated) DEVICE — SUCTION MANIFOLD NEPTUNE 2 SYS 1 PORT 702-025-000

## (undated) DEVICE — SOL ADH LIQUID BENZOIN SWAB 0.6ML C1544

## (undated) DEVICE — SOL RINGERS LACTATED 1000ML BAG 2B2324X

## (undated) DEVICE — SU PROLENE 2-0 SHDA 36" 8523H

## (undated) DEVICE — STOCKING SLEEVE COMPRESSION CALF MED

## (undated) DEVICE — NDL 25GA 1.5" 305127

## (undated) DEVICE — DRSG JAWBRA 93

## (undated) DEVICE — BNDG ABDOMINAL BINDER 10X26-50" 08140145

## (undated) DEVICE — PACK HEAD NECK SEN15HNFSF

## (undated) DEVICE — CONNECTOR STOPCOCK 3 WAY MALE LL 2C6204

## (undated) DEVICE — SOL WATER IRRIG 1000ML BOTTLE 2F7114

## (undated) DEVICE — SOL NACL 0.9% IRRIG 1000ML BOTTLE 2F7124

## (undated) DEVICE — SUCTION TIP YANKAUER STR K87

## (undated) DEVICE — PACK LAPAROTOMY CUSTOM LAKES

## (undated) DEVICE — DRAPE STERI TOWEL LG 1010

## (undated) DEVICE — SU DERMABOND ADVANCED .7ML DNX12

## (undated) DEVICE — SU MONOCRYL 4-0 PS-2 18" UND Y496G

## (undated) DEVICE — DRAPE STERI APERATURE 51X33" 1030

## (undated) DEVICE — LINEN TOWEL PACK X5 5464

## (undated) DEVICE — DRILL BIT STRK LINDEMANN 2.2MM 1608-002-043

## (undated) DEVICE — BUR STRK ROUND 2.0X54MM POLISHING 18 FLUTE 1608-006-159

## (undated) DEVICE — SYR 10ML LL W/O NDL 302995

## (undated) RX ORDER — KETOROLAC TROMETHAMINE 30 MG/ML
INJECTION, SOLUTION INTRAMUSCULAR; INTRAVENOUS
Status: DISPENSED
Start: 2025-02-16

## (undated) RX ORDER — FENTANYL CITRATE 50 UG/ML
INJECTION, SOLUTION INTRAMUSCULAR; INTRAVENOUS
Status: DISPENSED
Start: 2024-05-09

## (undated) RX ORDER — ONDANSETRON 2 MG/ML
INJECTION INTRAMUSCULAR; INTRAVENOUS
Status: DISPENSED
Start: 2024-05-09

## (undated) RX ORDER — TRANEXAMIC ACID 10 MG/ML
INJECTION, SOLUTION INTRAVENOUS
Status: DISPENSED
Start: 2025-02-16

## (undated) RX ORDER — LIDOCAINE HYDROCHLORIDE AND EPINEPHRINE 10; 10 MG/ML; UG/ML
INJECTION, SOLUTION INFILTRATION; PERINEURAL
Status: DISPENSED
Start: 2025-02-16

## (undated) RX ORDER — BUPIVACAINE HYDROCHLORIDE 5 MG/ML
INJECTION, SOLUTION EPIDURAL; INTRACAUDAL
Status: DISPENSED
Start: 2025-02-16

## (undated) RX ORDER — DEXAMETHASONE SODIUM PHOSPHATE 4 MG/ML
INJECTION, SOLUTION INTRA-ARTICULAR; INTRALESIONAL; INTRAMUSCULAR; INTRAVENOUS; SOFT TISSUE
Status: DISPENSED
Start: 2024-05-09

## (undated) RX ORDER — HYDROCODONE BITARTRATE AND ACETAMINOPHEN 5; 325 MG/1; MG/1
TABLET ORAL
Status: DISPENSED
Start: 2024-05-09

## (undated) RX ORDER — DEXAMETHASONE SODIUM PHOSPHATE 4 MG/ML
INJECTION, SOLUTION INTRA-ARTICULAR; INTRALESIONAL; INTRAMUSCULAR; INTRAVENOUS; SOFT TISSUE
Status: DISPENSED
Start: 2025-02-16

## (undated) RX ORDER — FENTANYL CITRATE 0.05 MG/ML
INJECTION, SOLUTION INTRAMUSCULAR; INTRAVENOUS
Status: DISPENSED
Start: 2024-05-09

## (undated) RX ORDER — PROPOFOL 10 MG/ML
INJECTION, EMULSION INTRAVENOUS
Status: DISPENSED
Start: 2025-02-16

## (undated) RX ORDER — MEPERIDINE HYDROCHLORIDE 25 MG/ML
INJECTION INTRAMUSCULAR; INTRAVENOUS; SUBCUTANEOUS
Status: DISPENSED
Start: 2025-02-16

## (undated) RX ORDER — PROPOFOL 10 MG/ML
INJECTION, EMULSION INTRAVENOUS
Status: DISPENSED
Start: 2024-05-09

## (undated) RX ORDER — FENTANYL CITRATE 50 UG/ML
INJECTION, SOLUTION INTRAMUSCULAR; INTRAVENOUS
Status: DISPENSED
Start: 2025-02-16

## (undated) RX ORDER — FENTANYL CITRATE-0.9 % NACL/PF 10 MCG/ML
PLASTIC BAG, INJECTION (ML) INTRAVENOUS
Status: DISPENSED
Start: 2025-02-16

## (undated) RX ORDER — OXYMETAZOLINE HYDROCHLORIDE 0.05 G/100ML
SPRAY NASAL
Status: DISPENSED
Start: 2024-05-09

## (undated) RX ORDER — ONDANSETRON 2 MG/ML
INJECTION INTRAMUSCULAR; INTRAVENOUS
Status: DISPENSED
Start: 2025-02-16

## (undated) RX ORDER — CHLORHEXIDINE GLUCONATE ORAL RINSE 1.2 MG/ML
SOLUTION DENTAL
Status: DISPENSED
Start: 2024-05-09

## (undated) RX ORDER — CEFAZOLIN SODIUM/WATER 2 G/20 ML
SYRINGE (ML) INTRAVENOUS
Status: DISPENSED
Start: 2024-05-09